# Patient Record
Sex: FEMALE | Race: WHITE | NOT HISPANIC OR LATINO
[De-identification: names, ages, dates, MRNs, and addresses within clinical notes are randomized per-mention and may not be internally consistent; named-entity substitution may affect disease eponyms.]

---

## 2017-07-03 ENCOUNTER — APPOINTMENT (OUTPATIENT)
Dept: ENDOCRINOLOGY | Facility: CLINIC | Age: 67
End: 2017-07-03

## 2017-07-03 ENCOUNTER — LABORATORY RESULT (OUTPATIENT)
Age: 67
End: 2017-07-03

## 2017-07-03 VITALS
BODY MASS INDEX: 23.39 KG/M2 | DIASTOLIC BLOOD PRESSURE: 70 MMHG | OXYGEN SATURATION: 97 % | WEIGHT: 137 LBS | HEART RATE: 68 BPM | SYSTOLIC BLOOD PRESSURE: 110 MMHG | HEIGHT: 64 IN

## 2017-07-03 VITALS — BODY MASS INDEX: 25.47 KG/M2 | HEIGHT: 61.5 IN

## 2017-07-03 DIAGNOSIS — Z82.0 FAMILY HISTORY OF EPILEPSY AND OTHER DISEASES OF THE NERVOUS SYSTEM: ICD-10-CM

## 2017-07-03 DIAGNOSIS — Z82.49 FAMILY HISTORY OF ISCHEMIC HEART DISEASE AND OTHER DISEASES OF THE CIRCULATORY SYSTEM: ICD-10-CM

## 2017-07-03 DIAGNOSIS — Z86.69 PERSONAL HISTORY OF OTHER DISEASES OF THE NERVOUS SYSTEM AND SENSE ORGANS: ICD-10-CM

## 2017-07-05 LAB
25(OH)D3 SERPL-MCNC: 35.3 NG/ML
ALBUMIN SERPL ELPH-MCNC: 4.2 G/DL
ALP BLD-CCNC: 79 U/L
ALT SERPL-CCNC: 18 U/L
ANION GAP SERPL CALC-SCNC: 16 MMOL/L
AST SERPL-CCNC: 23 U/L
BASOPHILS # BLD AUTO: 0.05 K/UL
BASOPHILS NFR BLD AUTO: 0.7 %
BILIRUB SERPL-MCNC: 0.4 MG/DL
BUN SERPL-MCNC: 23 MG/DL
CALCIUM SERPL-MCNC: 9.3 MG/DL
CHLORIDE SERPL-SCNC: 101 MMOL/L
CHOLEST SERPL-MCNC: 147 MG/DL
CHOLEST/HDLC SERPL: 1.9 RATIO
CO2 SERPL-SCNC: 24 MMOL/L
CREAT SERPL-MCNC: 0.72 MG/DL
CREAT SPEC-SCNC: 77 MG/DL
EOSINOPHIL # BLD AUTO: 0.34 K/UL
EOSINOPHIL NFR BLD AUTO: 4.7 %
GLUCOSE SERPL-MCNC: 199 MG/DL
HBA1C MFR BLD HPLC: 8 %
HCT VFR BLD CALC: 41.7 %
HDLC SERPL-MCNC: 78 MG/DL
HGB BLD-MCNC: 13.8 G/DL
IMM GRANULOCYTES NFR BLD AUTO: 0.6 %
LDLC SERPL CALC-MCNC: 50 MG/DL
LYMPHOCYTES # BLD AUTO: 2.29 K/UL
LYMPHOCYTES NFR BLD AUTO: 31.9 %
MAN DIFF?: NORMAL
MCHC RBC-ENTMCNC: 31.7 PG
MCHC RBC-ENTMCNC: 33.1 GM/DL
MCV RBC AUTO: 95.9 FL
MICROALBUMIN 24H UR DL<=1MG/L-MCNC: 0.7 MG/DL
MICROALBUMIN/CREAT 24H UR-RTO: 9 MG/G
MONOCYTES # BLD AUTO: 0.54 K/UL
MONOCYTES NFR BLD AUTO: 7.5 %
NEUTROPHILS # BLD AUTO: 3.91 K/UL
NEUTROPHILS NFR BLD AUTO: 54.6 %
PLATELET # BLD AUTO: 190 K/UL
POTASSIUM SERPL-SCNC: 4.3 MMOL/L
PROT SERPL-MCNC: 6.9 G/DL
RBC # BLD: 4.35 M/UL
RBC # FLD: 13.6 %
SODIUM SERPL-SCNC: 141 MMOL/L
T4 FREE SERPL-MCNC: 1.4 NG/DL
THYROGLOB AB SERPL-ACNC: <20 IU/ML
THYROPEROXIDASE AB SERPL IA-ACNC: 271 IU/ML
TRIGL SERPL-MCNC: 96 MG/DL
TSH SERPL-ACNC: 2.45 UIU/ML
WBC # FLD AUTO: 7.17 K/UL

## 2017-09-15 ENCOUNTER — MEDICATION RENEWAL (OUTPATIENT)
Age: 67
End: 2017-09-15

## 2017-09-18 ENCOUNTER — MEDICATION RENEWAL (OUTPATIENT)
Age: 67
End: 2017-09-18

## 2017-09-25 ENCOUNTER — MEDICATION RENEWAL (OUTPATIENT)
Age: 67
End: 2017-09-25

## 2017-09-25 RX ORDER — LANCETS
EACH MISCELLANEOUS
Qty: 700 | Refills: 3 | Status: ACTIVE | COMMUNITY
Start: 2017-09-25

## 2017-09-25 RX ORDER — LANCETS
EACH MISCELLANEOUS
Qty: 4 | Refills: 1 | Status: DISCONTINUED | COMMUNITY
Start: 2017-09-18 | End: 2017-09-25

## 2017-09-25 RX ORDER — BLOOD GLUCOSE CONTROL HIGH,LOW
EACH MISCELLANEOUS
Qty: 3 | Refills: 3 | Status: ACTIVE | COMMUNITY
Start: 2017-09-18

## 2017-09-25 RX ORDER — BLOOD SUGAR DIAGNOSTIC
STRIP MISCELLANEOUS 4 TIMES DAILY
Qty: 4 | Refills: 3 | Status: DISCONTINUED | COMMUNITY
Start: 2017-09-18 | End: 2017-09-25

## 2017-10-02 ENCOUNTER — OUTPATIENT (OUTPATIENT)
Dept: OUTPATIENT SERVICES | Facility: HOSPITAL | Age: 67
LOS: 1 days | End: 2017-10-02
Payer: MEDICARE

## 2017-10-02 ENCOUNTER — APPOINTMENT (OUTPATIENT)
Dept: MAMMOGRAPHY | Facility: IMAGING CENTER | Age: 67
End: 2017-10-02
Payer: MEDICARE

## 2017-10-02 DIAGNOSIS — Z00.8 ENCOUNTER FOR OTHER GENERAL EXAMINATION: ICD-10-CM

## 2017-10-02 PROCEDURE — 77063 BREAST TOMOSYNTHESIS BI: CPT

## 2017-10-02 PROCEDURE — 77067 SCR MAMMO BI INCL CAD: CPT

## 2017-10-02 PROCEDURE — G0202: CPT | Mod: 26

## 2017-10-02 PROCEDURE — 77063 BREAST TOMOSYNTHESIS BI: CPT | Mod: 26

## 2017-10-23 ENCOUNTER — APPOINTMENT (OUTPATIENT)
Dept: ENDOCRINOLOGY | Facility: CLINIC | Age: 67
End: 2017-10-23
Payer: MEDICARE

## 2017-10-23 VITALS
SYSTOLIC BLOOD PRESSURE: 110 MMHG | OXYGEN SATURATION: 96 % | HEART RATE: 60 BPM | BODY MASS INDEX: 25.53 KG/M2 | WEIGHT: 137 LBS | HEIGHT: 61.5 IN | DIASTOLIC BLOOD PRESSURE: 60 MMHG

## 2017-10-23 LAB
GLUCOSE BLDC GLUCOMTR-MCNC: 274
HBA1C MFR BLD HPLC: 7.9

## 2017-10-23 PROCEDURE — 82962 GLUCOSE BLOOD TEST: CPT

## 2017-10-23 PROCEDURE — 90686 IIV4 VACC NO PRSV 0.5 ML IM: CPT

## 2017-10-23 PROCEDURE — 99215 OFFICE O/P EST HI 40 MIN: CPT | Mod: 25

## 2017-10-23 PROCEDURE — 83036 HEMOGLOBIN GLYCOSYLATED A1C: CPT | Mod: QW

## 2017-10-23 PROCEDURE — G0008: CPT

## 2018-01-23 ENCOUNTER — APPOINTMENT (OUTPATIENT)
Dept: ENDOCRINOLOGY | Facility: CLINIC | Age: 68
End: 2018-01-23
Payer: MEDICARE

## 2018-01-23 VITALS
HEIGHT: 61.5 IN | HEART RATE: 79 BPM | OXYGEN SATURATION: 98 % | DIASTOLIC BLOOD PRESSURE: 70 MMHG | SYSTOLIC BLOOD PRESSURE: 118 MMHG | BODY MASS INDEX: 25.5 KG/M2 | WEIGHT: 136.8 LBS

## 2018-01-23 LAB
GLUCOSE BLDC GLUCOMTR-MCNC: 211
HBA1C MFR BLD HPLC: 8.2

## 2018-01-23 PROCEDURE — 83036 HEMOGLOBIN GLYCOSYLATED A1C: CPT | Mod: QW

## 2018-01-23 PROCEDURE — 99215 OFFICE O/P EST HI 40 MIN: CPT | Mod: 25

## 2018-01-23 PROCEDURE — 82962 GLUCOSE BLOOD TEST: CPT

## 2018-02-12 ENCOUNTER — CLINICAL ADVICE (OUTPATIENT)
Age: 68
End: 2018-02-12

## 2018-02-22 ENCOUNTER — CLINICAL ADVICE (OUTPATIENT)
Age: 68
End: 2018-02-22

## 2018-04-17 ENCOUNTER — APPOINTMENT (OUTPATIENT)
Dept: ENDOCRINOLOGY | Facility: CLINIC | Age: 68
End: 2018-04-17
Payer: MEDICARE

## 2018-04-17 VITALS
SYSTOLIC BLOOD PRESSURE: 115 MMHG | HEART RATE: 78 BPM | BODY MASS INDEX: 24.91 KG/M2 | DIASTOLIC BLOOD PRESSURE: 70 MMHG | WEIGHT: 134 LBS | OXYGEN SATURATION: 98 %

## 2018-04-17 LAB
GLUCOSE BLDC GLUCOMTR-MCNC: 98
HBA1C MFR BLD HPLC: 8.8

## 2018-04-17 PROCEDURE — 83036 HEMOGLOBIN GLYCOSYLATED A1C: CPT | Mod: QW

## 2018-04-17 PROCEDURE — 82962 GLUCOSE BLOOD TEST: CPT

## 2018-04-17 PROCEDURE — 99215 OFFICE O/P EST HI 40 MIN: CPT | Mod: 25

## 2018-04-18 LAB
25(OH)D3 SERPL-MCNC: 26.7 NG/ML
ALBUMIN SERPL ELPH-MCNC: 4.2 G/DL
ALP BLD-CCNC: 62 U/L
ALT SERPL-CCNC: 10 U/L
ANION GAP SERPL CALC-SCNC: 11 MMOL/L
AST SERPL-CCNC: 19 U/L
BASOPHILS # BLD AUTO: 0.04 K/UL
BASOPHILS NFR BLD AUTO: 0.6 %
BILIRUB SERPL-MCNC: 0.4 MG/DL
BUN SERPL-MCNC: 16 MG/DL
CALCIUM SERPL-MCNC: 10 MG/DL
CHLORIDE SERPL-SCNC: 105 MMOL/L
CHOLEST SERPL-MCNC: 155 MG/DL
CHOLEST/HDLC SERPL: 1.9 RATIO
CO2 SERPL-SCNC: 25 MMOL/L
CREAT SERPL-MCNC: 0.68 MG/DL
CREAT SPEC-SCNC: 56 MG/DL
EOSINOPHIL # BLD AUTO: 0.37 K/UL
EOSINOPHIL NFR BLD AUTO: 5.2 %
GLUCOSE SERPL-MCNC: 91 MG/DL
HCT VFR BLD CALC: 41.9 %
HDLC SERPL-MCNC: 83 MG/DL
HGB BLD-MCNC: 14.6 G/DL
IMM GRANULOCYTES NFR BLD AUTO: 0.1 %
LDLC SERPL CALC-MCNC: 60 MG/DL
LYMPHOCYTES # BLD AUTO: 3.21 K/UL
LYMPHOCYTES NFR BLD AUTO: 45 %
MAN DIFF?: NORMAL
MCHC RBC-ENTMCNC: 32.4 PG
MCHC RBC-ENTMCNC: 34.8 GM/DL
MCV RBC AUTO: 93.1 FL
MICROALBUMIN 24H UR DL<=1MG/L-MCNC: 0.5 MG/DL
MICROALBUMIN/CREAT 24H UR-RTO: 9 MG/G
MONOCYTES # BLD AUTO: 0.62 K/UL
MONOCYTES NFR BLD AUTO: 8.7 %
NEUTROPHILS # BLD AUTO: 2.89 K/UL
NEUTROPHILS NFR BLD AUTO: 40.4 %
PLATELET # BLD AUTO: 192 K/UL
POTASSIUM SERPL-SCNC: 4.2 MMOL/L
PROT SERPL-MCNC: 7.1 G/DL
RBC # BLD: 4.5 M/UL
RBC # FLD: 13.2 %
SODIUM SERPL-SCNC: 141 MMOL/L
T4 FREE SERPL-MCNC: 1.3 NG/DL
TRIGL SERPL-MCNC: 58 MG/DL
TSH SERPL-ACNC: 5.92 UIU/ML
WBC # FLD AUTO: 7.14 K/UL

## 2018-07-17 ENCOUNTER — APPOINTMENT (OUTPATIENT)
Dept: ENDOCRINOLOGY | Facility: CLINIC | Age: 68
End: 2018-07-17
Payer: MEDICARE

## 2018-07-17 VITALS — WEIGHT: 132 LBS | HEIGHT: 61.25 IN | BODY MASS INDEX: 24.6 KG/M2

## 2018-07-17 VITALS — OXYGEN SATURATION: 98 % | DIASTOLIC BLOOD PRESSURE: 72 MMHG | HEART RATE: 74 BPM | SYSTOLIC BLOOD PRESSURE: 122 MMHG

## 2018-07-17 DIAGNOSIS — Z87.39 PERSONAL HISTORY OF OTHER DISEASES OF THE MUSCULOSKELETAL SYSTEM AND CONNECTIVE TISSUE: ICD-10-CM

## 2018-07-17 LAB
GLUCOSE BLDC GLUCOMTR-MCNC: 126
HBA1C MFR BLD HPLC: 8

## 2018-07-17 PROCEDURE — ZZZZZ: CPT

## 2018-07-17 PROCEDURE — 99215 OFFICE O/P EST HI 40 MIN: CPT | Mod: 25

## 2018-07-17 PROCEDURE — 83036 HEMOGLOBIN GLYCOSYLATED A1C: CPT | Mod: QW

## 2018-07-17 PROCEDURE — 77080 DXA BONE DENSITY AXIAL: CPT

## 2018-07-17 PROCEDURE — 82962 GLUCOSE BLOOD TEST: CPT

## 2018-07-18 LAB
25(OH)D3 SERPL-MCNC: 28.1 NG/ML
ALBUMIN SERPL ELPH-MCNC: 4.2 G/DL
ALP BLD-CCNC: 61 U/L
ALT SERPL-CCNC: 15 U/L
ANION GAP SERPL CALC-SCNC: 13 MMOL/L
AST SERPL-CCNC: 22 U/L
BILIRUB SERPL-MCNC: 0.5 MG/DL
BUN SERPL-MCNC: 18 MG/DL
CALCIUM SERPL-MCNC: 10 MG/DL
CHLORIDE SERPL-SCNC: 103 MMOL/L
CO2 SERPL-SCNC: 26 MMOL/L
CREAT SERPL-MCNC: 0.67 MG/DL
GLUCOSE SERPL-MCNC: 124 MG/DL
POTASSIUM SERPL-SCNC: 4.1 MMOL/L
PROT SERPL-MCNC: 6.8 G/DL
SODIUM SERPL-SCNC: 142 MMOL/L
T4 FREE SERPL-MCNC: 1.5 NG/DL
TSH SERPL-ACNC: 1.52 UIU/ML

## 2018-09-04 ENCOUNTER — MEDICATION RENEWAL (OUTPATIENT)
Age: 68
End: 2018-09-04

## 2018-10-03 ENCOUNTER — APPOINTMENT (OUTPATIENT)
Dept: MAMMOGRAPHY | Facility: IMAGING CENTER | Age: 68
End: 2018-10-03
Payer: MEDICARE

## 2018-10-03 ENCOUNTER — APPOINTMENT (OUTPATIENT)
Dept: ULTRASOUND IMAGING | Facility: IMAGING CENTER | Age: 68
End: 2018-10-03
Payer: MEDICARE

## 2018-10-03 ENCOUNTER — OUTPATIENT (OUTPATIENT)
Dept: OUTPATIENT SERVICES | Facility: HOSPITAL | Age: 68
LOS: 1 days | End: 2018-10-03
Payer: MEDICARE

## 2018-10-03 DIAGNOSIS — Z12.31 ENCOUNTER FOR SCREENING MAMMOGRAM FOR MALIGNANT NEOPLASM OF BREAST: ICD-10-CM

## 2018-10-03 DIAGNOSIS — Z00.8 ENCOUNTER FOR OTHER GENERAL EXAMINATION: ICD-10-CM

## 2018-10-03 DIAGNOSIS — N60.19 DIFFUSE CYSTIC MASTOPATHY OF UNSPECIFIED BREAST: ICD-10-CM

## 2018-10-03 PROCEDURE — 76641 ULTRASOUND BREAST COMPLETE: CPT | Mod: 26,50

## 2018-10-03 PROCEDURE — 77067 SCR MAMMO BI INCL CAD: CPT

## 2018-10-03 PROCEDURE — 76641 ULTRASOUND BREAST COMPLETE: CPT

## 2018-10-03 PROCEDURE — 77063 BREAST TOMOSYNTHESIS BI: CPT | Mod: 26

## 2018-10-03 PROCEDURE — 77067 SCR MAMMO BI INCL CAD: CPT | Mod: 26

## 2018-10-03 PROCEDURE — 77063 BREAST TOMOSYNTHESIS BI: CPT

## 2018-10-09 ENCOUNTER — APPOINTMENT (OUTPATIENT)
Dept: ENDOCRINOLOGY | Facility: CLINIC | Age: 68
End: 2018-10-09
Payer: MEDICARE

## 2018-10-09 VITALS
HEART RATE: 68 BPM | SYSTOLIC BLOOD PRESSURE: 110 MMHG | BODY MASS INDEX: 25.31 KG/M2 | OXYGEN SATURATION: 98 % | WEIGHT: 135.8 LBS | HEIGHT: 61.25 IN | DIASTOLIC BLOOD PRESSURE: 60 MMHG

## 2018-10-09 LAB
GLUCOSE BLDC GLUCOMTR-MCNC: 160
HBA1C MFR BLD HPLC: 7.8

## 2018-10-09 PROCEDURE — 99215 OFFICE O/P EST HI 40 MIN: CPT | Mod: 25

## 2018-10-09 PROCEDURE — 95251 CONT GLUC MNTR ANALYSIS I&R: CPT

## 2018-10-09 PROCEDURE — G0008: CPT

## 2018-10-09 PROCEDURE — 83036 HEMOGLOBIN GLYCOSYLATED A1C: CPT | Mod: QW

## 2018-10-09 PROCEDURE — 90662 IIV NO PRSV INCREASED AG IM: CPT

## 2018-11-14 ENCOUNTER — RX RENEWAL (OUTPATIENT)
Age: 68
End: 2018-11-14

## 2018-12-10 ENCOUNTER — RX RENEWAL (OUTPATIENT)
Age: 68
End: 2018-12-10

## 2018-12-16 ENCOUNTER — RX RENEWAL (OUTPATIENT)
Age: 68
End: 2018-12-16

## 2018-12-18 ENCOUNTER — RX RENEWAL (OUTPATIENT)
Age: 68
End: 2018-12-18

## 2019-01-03 ENCOUNTER — RX RENEWAL (OUTPATIENT)
Age: 69
End: 2019-01-03

## 2019-02-13 ENCOUNTER — APPOINTMENT (OUTPATIENT)
Dept: ENDOCRINOLOGY | Facility: CLINIC | Age: 69
End: 2019-02-13
Payer: MEDICARE

## 2019-02-13 VITALS
SYSTOLIC BLOOD PRESSURE: 110 MMHG | WEIGHT: 134 LBS | OXYGEN SATURATION: 96 % | HEART RATE: 69 BPM | BODY MASS INDEX: 24.98 KG/M2 | HEIGHT: 61.25 IN | DIASTOLIC BLOOD PRESSURE: 70 MMHG

## 2019-02-13 LAB
GLUCOSE BLDC GLUCOMTR-MCNC: 177
HBA1C MFR BLD HPLC: 8.1

## 2019-02-13 PROCEDURE — 99214 OFFICE O/P EST MOD 30 MIN: CPT | Mod: 25

## 2019-02-13 PROCEDURE — 95251 CONT GLUC MNTR ANALYSIS I&R: CPT

## 2019-02-13 PROCEDURE — 83036 HEMOGLOBIN GLYCOSYLATED A1C: CPT | Mod: QW

## 2019-02-13 RX ORDER — FLASH GLUCOSE SENSOR
KIT MISCELLANEOUS
Qty: 1 | Refills: 0 | Status: DISCONTINUED | COMMUNITY
Start: 2018-09-04 | End: 2019-02-13

## 2019-02-13 RX ORDER — FLASH GLUCOSE SENSOR
KIT MISCELLANEOUS
Qty: 3 | Refills: 3 | Status: DISCONTINUED | COMMUNITY
Start: 2018-09-04 | End: 2019-02-13

## 2019-02-13 NOTE — REVIEW OF SYSTEMS
[Joint Pain] : joint pain [Hair Loss] : hair loss [Negative] : Gastrointestinal [All other systems negative] : All other systems negative [Polyuria] : no polyuria [Pain/Numbness of Digits] : no pain/numbness of digits [Polydipsia] : no polydipsia [Swelling] : no swelling

## 2019-02-13 NOTE — ASSESSMENT
[Carbohydrate Consistent Diet] : carbohydrate consistent diet [Hypoglycemia Management] : hypoglycemia management [Bisphosphonate Therapy] : Risks  and benefits of bisphosphonate therapy were  discussed with the patient including gastroesophageal irritation, osteonecrosis of the jaw, and atypical femur fractures, and acute phase reaction [Levothyroxine] : The patient was instructed to take Levothyroxine on an empty stomach, separate from vitamins, and wait at least 30 minutes before eating [FreeTextEntry1] : 69 y.o. female with h/o Type 1 DM, HTN, hyperlipidemia, hypothyroidism and osteoporosis. \par 1. Type 1 DM- Suboptimal control with Hba1c of 8.1%. Encouraged carbohydrate consistent diet. Glucometer and Freestyle Leonora download reviewed. Will continue Tresiba 22 units daily and will continue Humalog 5 units before breakfast and 6 to 7 units before lunch and dinner. Encouraged patient to avoid titrating up her Tresiba and rather treat bedtime snack with Humalog 1 to 2 units.  Suspect needs less Tresiba; however patient is reluctant to lower dose.  Encouraged patient to keep in touch with blood glucose readings. Will reorder Freestyle Leonora 14 day wear. Will check CMP and urine microalb/cr ratio. \par 2. HTN- BP is at goal and will continue medications\par 3. Hyperlipidemia- Will check lipids and will continue statin\par 4. Hypothyroidism/Hashimoto's disease- Patient appears euthyroid. Will check TFTs.  Will continue LT4 75 mcg daily. \par 5. Osteoporosis- DEXA scan performed in July 2018 shows spine -1.1 which is stable with arthritis, left femoral neck -2.1 which is stable and total hip -2.0 which is stable and 1/3 radius -3.6 which is stable. Given increased risk of fracture, will continue Alendronate 70 mg po weekly. Encouraged weight bearing activity. Discussed appropriate calcium and vitamin D intake. Will continue vitamin D3 2,000 IU daily.  Will repeat DEXA scan in 2020.\par \par Follow up in 3 months

## 2019-02-13 NOTE — HISTORY OF PRESENT ILLNESS
[Hypoglycemia] : hypoglycemic [FreeTextEntry1] : 69 y.o. female with h/o Type 1 1/2 DM diagnosed in 2009, osteoporosis, and hypothyroidism here for follow up visit. No acute events since last visit. On basal bolus regimen. Does get hypoglycemia in the late afternoon if delays eating. Taking Tresiba 22 units daily because when decreased Tresiba to 20 units developed fasting hyperglycemia. Takes Humalog 5 units before breakfast, 6 to 7 units before lunch and dinner. Does have evening snack and typically does not cover with insulin. No complications. UTD with optho and no retinopathy. Using Freestyle Leonora now but concerned about accuracy. Checks FS up to 6 times per day. No foot complaints. \par \par In regards to osteoporosis,  DEXA scan in July 2017 left femoral neck -2.2, total hip -2.0 and 1/3 radius -3.6 and spine -1.3 is falsely elevated. DEXA scan performed in July 2018 shows spine -1.1 which is stable with arthritis, left femoral neck -2.1 which is stable and total hip -2.0 which is stable and 1/3 radius -3.6 which is stable. Taking Alendronate 70 mg weekly since July 2017. No dental issues. Takes calcium 500 mg daily and vitamin D 2,000 Iu daily. No falls or fractures. \par \par In regards to hypothyroidism, takes LT4 75 mcg daily. Feeling good.

## 2019-02-13 NOTE — PHYSICAL EXAM
[Alert] : alert [No Acute Distress] : no acute distress [Normal Sclera/Conjunctiva] : normal sclera/conjunctiva [EOMI] : extra ocular movement intact [Supple] : the neck was supple [No LAD] : no lymphadenopathy [Thyroid Not Enlarged] : the thyroid was not enlarged [No Accessory Muscle Use] : no accessory muscle use [Clear to Auscultation] : lungs were clear to auscultation bilaterally [Normal S1, S2] : normal S1 and S2 [Regular Rhythm] : with a regular rhythm [Pedal Pulses Normal] : the pedal pulses are present [No Edema] : there was no peripheral edema [Normal Bowel Sounds] : normal bowel sounds [Not Tender] : non-tender [Soft] : abdomen soft [Not Distended] : not distended [No Clubbing, Cyanosis] : no clubbing  or cyanosis of the fingernails [No Rash] : no rash [Right Foot Was Examined] : right foot ~C was examined [Left Foot Was Examined] : left foot ~C was examined [Normal] : normal [2+] : 2+ in the dorsalis pedis [Diminished Throughout Both Feet] : diminished tactile sensation with monofilament testing throughout both feet [Normal Reflexes] : deep tendon reflexes were 2+ and symmetric [Normal Affect] : the affect was normal [Normal Mood] : the mood was normal [FreeTextEntry1] : bunion

## 2019-02-14 LAB
25(OH)D3 SERPL-MCNC: 39.1 NG/ML
ALBUMIN SERPL ELPH-MCNC: 3.8 G/DL
ALP BLD-CCNC: 69 U/L
ALT SERPL-CCNC: 13 U/L
ANION GAP SERPL CALC-SCNC: 10 MMOL/L
AST SERPL-CCNC: 25 U/L
BASOPHILS # BLD AUTO: 0.04 K/UL
BASOPHILS NFR BLD AUTO: 0.6 %
BILIRUB SERPL-MCNC: 0.3 MG/DL
BUN SERPL-MCNC: 16 MG/DL
CALCIUM SERPL-MCNC: 9.4 MG/DL
CHLORIDE SERPL-SCNC: 102 MMOL/L
CHOLEST SERPL-MCNC: 122 MG/DL
CHOLEST/HDLC SERPL: 2 RATIO
CO2 SERPL-SCNC: 26 MMOL/L
CREAT SERPL-MCNC: 0.57 MG/DL
CREAT SPEC-SCNC: 90 MG/DL
EOSINOPHIL # BLD AUTO: 0.39 K/UL
EOSINOPHIL NFR BLD AUTO: 6.1 %
GLUCOSE SERPL-MCNC: 196 MG/DL
HCT VFR BLD CALC: 42.3 %
HDLC SERPL-MCNC: 62 MG/DL
HGB BLD-MCNC: 13.4 G/DL
IMM GRANULOCYTES NFR BLD AUTO: 0.3 %
LDLC SERPL CALC-MCNC: 39 MG/DL
LYMPHOCYTES # BLD AUTO: 2.31 K/UL
LYMPHOCYTES NFR BLD AUTO: 36.2 %
MAN DIFF?: NORMAL
MCHC RBC-ENTMCNC: 31.2 PG
MCHC RBC-ENTMCNC: 31.7 GM/DL
MCV RBC AUTO: 98.4 FL
MICROALBUMIN 24H UR DL<=1MG/L-MCNC: <1.2 MG/DL
MICROALBUMIN/CREAT 24H UR-RTO: NORMAL
MONOCYTES # BLD AUTO: 0.48 K/UL
MONOCYTES NFR BLD AUTO: 7.5 %
NEUTROPHILS # BLD AUTO: 3.15 K/UL
NEUTROPHILS NFR BLD AUTO: 49.3 %
PLATELET # BLD AUTO: 232 K/UL
POTASSIUM SERPL-SCNC: 4.3 MMOL/L
PROT SERPL-MCNC: 7 G/DL
RBC # BLD: 4.3 M/UL
RBC # FLD: 13.1 %
SODIUM SERPL-SCNC: 138 MMOL/L
T4 FREE SERPL-MCNC: 1.5 NG/DL
TRIGL SERPL-MCNC: 104 MG/DL
TSH SERPL-ACNC: 1.74 UIU/ML
VIT B12 SERPL-MCNC: 771 PG/ML
WBC # FLD AUTO: 6.39 K/UL

## 2019-03-18 ENCOUNTER — RX CHANGE (OUTPATIENT)
Age: 69
End: 2019-03-18

## 2019-05-09 ENCOUNTER — RX RENEWAL (OUTPATIENT)
Age: 69
End: 2019-05-09

## 2019-06-24 ENCOUNTER — RX CHANGE (OUTPATIENT)
Age: 69
End: 2019-06-24

## 2019-06-24 RX ORDER — INSULIN DEGLUDEC INJECTION 100 U/ML
100 INJECTION, SOLUTION SUBCUTANEOUS
Qty: 2 | Refills: 3 | Status: COMPLETED | COMMUNITY
Start: 2017-07-03 | End: 2019-06-24

## 2019-06-26 ENCOUNTER — APPOINTMENT (OUTPATIENT)
Dept: ENDOCRINOLOGY | Facility: CLINIC | Age: 69
End: 2019-06-26
Payer: MEDICARE

## 2019-06-26 VITALS
OXYGEN SATURATION: 97 % | DIASTOLIC BLOOD PRESSURE: 60 MMHG | SYSTOLIC BLOOD PRESSURE: 110 MMHG | BODY MASS INDEX: 24.44 KG/M2 | WEIGHT: 131.13 LBS | HEART RATE: 68 BPM | HEIGHT: 61.25 IN

## 2019-06-26 LAB
GLUCOSE BLDC GLUCOMTR-MCNC: 97
HBA1C MFR BLD HPLC: 7.7

## 2019-06-26 PROCEDURE — 83036 HEMOGLOBIN GLYCOSYLATED A1C: CPT | Mod: QW

## 2019-06-26 PROCEDURE — 82962 GLUCOSE BLOOD TEST: CPT

## 2019-06-26 PROCEDURE — 99214 OFFICE O/P EST MOD 30 MIN: CPT | Mod: 25

## 2019-06-26 RX ORDER — INSULIN GLARGINE 100 [IU]/ML
100 INJECTION, SOLUTION SUBCUTANEOUS
Qty: 3 | Refills: 2 | Status: DISCONTINUED | COMMUNITY
Start: 2019-06-24 | End: 2019-06-26

## 2019-06-26 NOTE — HISTORY OF PRESENT ILLNESS
[FreeTextEntry1] : 69 y.o. female with h/o Type 1 1/2 DM diagnosed in 2009, osteoporosis, and hypothyroidism here for follow up visit. No acute events since last visit. On basal bolus regimen. Does get hypoglycemia in the late afternoon if delays eating. Taking Tresiba 22 units daily because when decreased Tresiba to 20 units developed fasting hyperglycemia. Takes Humalog 5 units before breakfast, 6 units before lunch and 6 to 7 units before dinner. Does have evening snack and typically does not cover with insulin. No complications. UTD with optho and no retinopathy. Not using Freestyle Leonora now because concerned about accuracy. Checks FS up to 6 times per day. No foot complaints. UTD with cardiology and normal stress test. \par \par In regards to osteoporosis,  DEXA scan in July 2017 left femoral neck -2.2, total hip -2.0 and 1/3 radius -3.6 and spine -1.3 is falsely elevated. DEXA scan performed in July 2018 shows spine -1.1 which is stable with arthritis, left femoral neck -2.1 which is stable and total hip -2.0 which is stable and 1/3 radius -3.6 which is stable. Taking Alendronate 70 mg weekly since July 2017. No dental issues. Takes calcium 500 mg daily and vitamin D 2,000 Iu daily. No falls or fractures. \par \par In regards to hypothyroidism, takes LT4 75 mcg daily. Feeling good.

## 2019-06-26 NOTE — REVIEW OF SYSTEMS
[Joint Pain] : joint pain [Hair Loss] : hair loss [Negative] : Gastrointestinal [All other systems negative] : All other systems negative [Pain/Numbness of Digits] : no pain/numbness of digits [Polyuria] : no polyuria [Polydipsia] : no polydipsia [Swelling] : no swelling

## 2019-06-26 NOTE — ASSESSMENT
[Carbohydrate Consistent Diet] : carbohydrate consistent diet [Hypoglycemia Management] : hypoglycemia management [Bisphosphonate Therapy] : Risks  and benefits of bisphosphonate therapy were  discussed with the patient including gastroesophageal irritation, osteonecrosis of the jaw, and atypical femur fractures, and acute phase reaction [Levothyroxine] : The patient was instructed to take Levothyroxine on an empty stomach, separate from vitamins, and wait at least 30 minutes before eating [FreeTextEntry1] : 69 y.o. female with h/o Type 1 DM, HTN, hyperlipidemia, hypothyroidism and osteoporosis. \par 1. Type 1 DM- Suboptimal control with Hba1c of 7.7%. Encouraged carbohydrate consistent diet. Glucometer download reviewed. Will decrease Tresiba to 20 units daily and will continue Humalog 5 units before breakfast and 6 units before lunch and will increase to 7 to 8 units before dinner. Encouraged patient to keep in touch with blood glucose readings. Patient will consider retrying Freestyle Leonora. Will check CMP and urine microalb/cr ratio. \par 2. HTN- BP is at goal and will continue medications\par 3. Hyperlipidemia- Will check lipids and will continue statin\par 4. Hypothyroidism/Hashimoto's disease- Patient appears euthyroid. Will check TFTs.  Will continue LT4 75 mcg daily. \par 5. Osteoporosis- DEXA scan performed in July 2018 shows spine -1.1 which is stable with arthritis, left femoral neck -2.1 which is stable and total hip -2.0 which is stable and 1/3 radius -3.6 which is stable. Given increased risk of fracture, will continue Alendronate 70 mg po weekly. Encouraged weight bearing activity. Discussed appropriate calcium and vitamin D intake. Will continue vitamin D3 2,000 IU daily.  Will repeat DEXA scan in 2020.\par \par Follow up in 3 months [Diabetes Foot Care] : diabetes foot care

## 2019-06-28 LAB
25(OH)D3 SERPL-MCNC: 36.9 NG/ML
ALBUMIN SERPL ELPH-MCNC: 4.2 G/DL
ALP BLD-CCNC: 57 U/L
ALT SERPL-CCNC: 16 U/L
ANION GAP SERPL CALC-SCNC: 12 MMOL/L
AST SERPL-CCNC: 22 U/L
BASOPHILS # BLD AUTO: 0.06 K/UL
BASOPHILS NFR BLD AUTO: 0.9 %
BILIRUB SERPL-MCNC: 0.4 MG/DL
BUN SERPL-MCNC: 17 MG/DL
CALCIUM SERPL-MCNC: 9.5 MG/DL
CHLORIDE SERPL-SCNC: 104 MMOL/L
CHOLEST SERPL-MCNC: 122 MG/DL
CHOLEST/HDLC SERPL: 1.7 RATIO
CO2 SERPL-SCNC: 25 MMOL/L
CREAT SERPL-MCNC: 0.67 MG/DL
CREAT SPEC-SCNC: 88 MG/DL
EOSINOPHIL # BLD AUTO: 0.29 K/UL
EOSINOPHIL NFR BLD AUTO: 4.2 %
GLUCOSE SERPL-MCNC: 104 MG/DL
HCT VFR BLD CALC: 40.3 %
HDLC SERPL-MCNC: 73 MG/DL
HGB BLD-MCNC: 13.5 G/DL
IMM GRANULOCYTES NFR BLD AUTO: 0.3 %
LDLC SERPL CALC-MCNC: 33 MG/DL
LYMPHOCYTES # BLD AUTO: 2.5 K/UL
LYMPHOCYTES NFR BLD AUTO: 36.4 %
MAN DIFF?: NORMAL
MCHC RBC-ENTMCNC: 32.1 PG
MCHC RBC-ENTMCNC: 33.5 GM/DL
MCV RBC AUTO: 96 FL
MICROALBUMIN 24H UR DL<=1MG/L-MCNC: <1.2 MG/DL
MICROALBUMIN/CREAT 24H UR-RTO: NORMAL MG/G
MONOCYTES # BLD AUTO: 0.57 K/UL
MONOCYTES NFR BLD AUTO: 8.3 %
NEUTROPHILS # BLD AUTO: 3.42 K/UL
NEUTROPHILS NFR BLD AUTO: 49.9 %
PLATELET # BLD AUTO: 184 K/UL
POTASSIUM SERPL-SCNC: 4 MMOL/L
PROT SERPL-MCNC: 6.5 G/DL
RBC # BLD: 4.2 M/UL
RBC # FLD: 12.6 %
SODIUM SERPL-SCNC: 141 MMOL/L
T4 FREE SERPL-MCNC: 1.4 NG/DL
TRIGL SERPL-MCNC: 82 MG/DL
TSH SERPL-ACNC: 1.54 UIU/ML
WBC # FLD AUTO: 6.86 K/UL

## 2019-07-12 RX ORDER — INSULIN GLARGINE 300 U/ML
300 INJECTION, SOLUTION SUBCUTANEOUS
Qty: 2 | Refills: 3 | Status: DISCONTINUED | COMMUNITY
Start: 2019-06-26 | End: 2019-07-12

## 2019-10-10 ENCOUNTER — APPOINTMENT (OUTPATIENT)
Dept: MAMMOGRAPHY | Facility: IMAGING CENTER | Age: 69
End: 2019-10-10
Payer: MEDICARE

## 2019-10-10 ENCOUNTER — OUTPATIENT (OUTPATIENT)
Dept: OUTPATIENT SERVICES | Facility: HOSPITAL | Age: 69
LOS: 1 days | End: 2019-10-10
Payer: MEDICARE

## 2019-10-10 ENCOUNTER — APPOINTMENT (OUTPATIENT)
Dept: ULTRASOUND IMAGING | Facility: IMAGING CENTER | Age: 69
End: 2019-10-10
Payer: MEDICARE

## 2019-10-10 DIAGNOSIS — Z00.8 ENCOUNTER FOR OTHER GENERAL EXAMINATION: ICD-10-CM

## 2019-10-10 PROCEDURE — 77067 SCR MAMMO BI INCL CAD: CPT

## 2019-10-10 PROCEDURE — 77067 SCR MAMMO BI INCL CAD: CPT | Mod: 26

## 2019-10-10 PROCEDURE — 76641 ULTRASOUND BREAST COMPLETE: CPT

## 2019-10-10 PROCEDURE — 77063 BREAST TOMOSYNTHESIS BI: CPT

## 2019-10-10 PROCEDURE — 77063 BREAST TOMOSYNTHESIS BI: CPT | Mod: 26

## 2019-10-10 PROCEDURE — 76641 ULTRASOUND BREAST COMPLETE: CPT | Mod: 26,50

## 2019-10-16 ENCOUNTER — OUTPATIENT (OUTPATIENT)
Dept: OUTPATIENT SERVICES | Facility: HOSPITAL | Age: 69
LOS: 1 days | End: 2019-10-16
Payer: MEDICARE

## 2019-10-16 ENCOUNTER — APPOINTMENT (OUTPATIENT)
Dept: ULTRASOUND IMAGING | Facility: IMAGING CENTER | Age: 69
End: 2019-10-16
Payer: MEDICARE

## 2019-10-16 DIAGNOSIS — Z00.8 ENCOUNTER FOR OTHER GENERAL EXAMINATION: ICD-10-CM

## 2019-10-16 PROCEDURE — 76642 ULTRASOUND BREAST LIMITED: CPT | Mod: 26,LT

## 2019-10-16 PROCEDURE — 76642 ULTRASOUND BREAST LIMITED: CPT

## 2019-10-18 ENCOUNTER — APPOINTMENT (OUTPATIENT)
Dept: ENDOCRINOLOGY | Facility: CLINIC | Age: 69
End: 2019-10-18
Payer: MEDICARE

## 2019-10-18 VITALS
SYSTOLIC BLOOD PRESSURE: 120 MMHG | OXYGEN SATURATION: 96 % | WEIGHT: 128 LBS | DIASTOLIC BLOOD PRESSURE: 60 MMHG | HEART RATE: 65 BPM | BODY MASS INDEX: 23.86 KG/M2 | HEIGHT: 61.42 IN

## 2019-10-18 LAB
GLUCOSE BLDC GLUCOMTR-MCNC: 98
HBA1C MFR BLD HPLC: 7.9

## 2019-10-18 PROCEDURE — 99214 OFFICE O/P EST MOD 30 MIN: CPT | Mod: 25

## 2019-10-18 PROCEDURE — G0008: CPT

## 2019-10-18 PROCEDURE — 83036 HEMOGLOBIN GLYCOSYLATED A1C: CPT | Mod: QW

## 2019-10-18 PROCEDURE — 90653 IIV ADJUVANT VACCINE IM: CPT

## 2019-10-18 PROCEDURE — 82962 GLUCOSE BLOOD TEST: CPT

## 2019-10-18 NOTE — PHYSICAL EXAM
[Alert] : alert [Normal Sclera/Conjunctiva] : normal sclera/conjunctiva [No Acute Distress] : no acute distress [EOMI] : extra ocular movement intact [No LAD] : no lymphadenopathy [Supple] : the neck was supple [Clear to Auscultation] : lungs were clear to auscultation bilaterally [Thyroid Not Enlarged] : the thyroid was not enlarged [No Accessory Muscle Use] : no accessory muscle use [Normal S1, S2] : normal S1 and S2 [Regular Rhythm] : with a regular rhythm [No Edema] : there was no peripheral edema [Pedal Pulses Normal] : the pedal pulses are present [Normal Bowel Sounds] : normal bowel sounds [Not Distended] : not distended [Not Tender] : non-tender [Soft] : abdomen soft [No Rash] : no rash [No Clubbing, Cyanosis] : no clubbing  or cyanosis of the fingernails [Right Foot Was Examined] : right foot ~C was examined [Left Foot Was Examined] : left foot ~C was examined [Normal] : normal [Normal Reflexes] : deep tendon reflexes were 2+ and symmetric [Diminished Throughout Both Feet] : diminished tactile sensation with monofilament testing throughout both feet [2+] : 2+ in the dorsalis pedis [Normal Affect] : the affect was normal [Normal Mood] : the mood was normal [FreeTextEntry1] : bunion [FreeTextEntry2] : hammer toes [FreeTextEntry6] : hammer toes

## 2019-10-18 NOTE — HISTORY OF PRESENT ILLNESS
[FreeTextEntry1] : 69 y.o. female with h/o Type 1 1/2 DM diagnosed in 2009, osteoporosis, and hypothyroidism here for follow up visit. No acute events since last visit. On basal bolus regimen. Does get hypoglycemia after breakfast. Taking Tresiba 22 units daily because when decreased Tresiba to 20 units developed fasting hyperglycemia. Takes Humalog 5 units before breakfast, 6 units before lunch and 6 to 7 units before dinner. Does have evening snack which includes 1/2 apple with no protein and typically does not cover with insulin. No complications. UTD with optho and no retinopathy. Not using Freestyle Leonora now because concerned about accuracy. Checks FS up to 6 times per day. No foot complaints. No proteinuria. UTD with cardiology and normal stress test. Reports weight loss. No abdominal pain and no change in bowels. \par \par In regards to osteoporosis,  DEXA scan in July 2017 left femoral neck -2.2, total hip -2.0 and 1/3 radius -3.6 and spine -1.3 is falsely elevated. DEXA scan performed in July 2018 shows spine -1.1 which is stable with arthritis, left femoral neck -2.1 which is stable and total hip -2.0 which is stable and 1/3 radius -3.6 which is stable. Taking Alendronate 70 mg weekly since July 2017. Working with dentist. Takes calcium 500 mg daily and vitamin D 2,000 Iu daily. No falls or fractures. \par \par In regards to hypothyroidism, takes LT4 75 mcg daily. Feeling good. Reports weight loss.  [___] : [unfilled] [Hypoglycemia] : hypoglycemic

## 2019-10-18 NOTE — ASSESSMENT
[FreeTextEntry1] : 69 y.o. female with h/o Type 1 DM, HTN, hyperlipidemia, hypothyroidism and osteoporosis. \par 1. Type 1 DM- Suboptimal control with Hba1c of 7.9% today. Encouraged carbohydrate consistent diet. Glucometer download reviewed. Will continue Tresiba 22 units daily and will decrease Humalog to 4 units before breakfast and will continue 6 units before lunch and 6 to 7 units before dinner. Also encouraged modifying bedtime snack. Encouraged patient to keep in touch with blood glucose readings. Patient will consider retrying Freestyle Leonora. Will check CMP and urine microalb/cr ratio. \par 2. HTN- BP is at goal and will continue medications\par 3. Hyperlipidemia- Will check lipids and will continue statin\par 4. Hypothyroidism/Hashimoto's disease- Patient appears euthyroid. Will check TFTs.  Will continue LT4 75 mcg daily. \par 5. Osteoporosis- DEXA scan performed in July 2018 shows spine -1.1 which is stable with arthritis, left femoral neck -2.1 which is stable and total hip -2.0 which is stable and 1/3 radius -3.6 which is stable. Given increased risk of fracture, will continue Alendronate 70 mg po weekly. Encouraged weight bearing activity. Discussed appropriate calcium and vitamin D intake. Will continue vitamin D3 2,000 IU daily.  Will repeat DEXA scan in 2020.\par \par Follow up in 3 months\par Flu vaccine given today [Carbohydrate Consistent Diet] : carbohydrate consistent diet [Hypoglycemia Management] : hypoglycemia management [Diabetes Foot Care] : diabetes foot care [Bisphosphonate Therapy] : Risks  and benefits of bisphosphonate therapy were  discussed with the patient including gastroesophageal irritation, osteonecrosis of the jaw, and atypical femur fractures, and acute phase reaction [Levothyroxine] : The patient was instructed to take Levothyroxine on an empty stomach, separate from vitamins, and wait at least 30 minutes before eating

## 2019-10-18 NOTE — REVIEW OF SYSTEMS
[Recent Weight Loss (___ Lbs)] : recent [unfilled] ~Ulb weight loss [Polyuria] : no polyuria [Joint Pain] : joint pain [Hair Loss] : hair loss [Pain/Numbness of Digits] : no pain/numbness of digits [Polydipsia] : no polydipsia [Swelling] : no swelling [All other systems negative] : All other systems negative [Negative] : Gastrointestinal

## 2019-10-19 LAB
ALBUMIN SERPL ELPH-MCNC: 4.3 G/DL
ALP BLD-CCNC: 60 U/L
ALT SERPL-CCNC: 13 U/L
ANION GAP SERPL CALC-SCNC: 13 MMOL/L
AST SERPL-CCNC: 23 U/L
BASOPHILS # BLD AUTO: 0.05 K/UL
BASOPHILS NFR BLD AUTO: 0.8 %
BILIRUB SERPL-MCNC: 0.4 MG/DL
BUN SERPL-MCNC: 15 MG/DL
CALCIUM SERPL-MCNC: 9.7 MG/DL
CHLORIDE SERPL-SCNC: 103 MMOL/L
CHOLEST SERPL-MCNC: 129 MG/DL
CHOLEST/HDLC SERPL: 1.7 RATIO
CO2 SERPL-SCNC: 25 MMOL/L
CREAT SERPL-MCNC: 0.55 MG/DL
CREAT SPEC-SCNC: 45 MG/DL
EOSINOPHIL # BLD AUTO: 0.2 K/UL
EOSINOPHIL NFR BLD AUTO: 3 %
GLUCOSE SERPL-MCNC: 67 MG/DL
HCT VFR BLD CALC: 43.6 %
HDLC SERPL-MCNC: 74 MG/DL
HGB BLD-MCNC: 14.1 G/DL
IMM GRANULOCYTES NFR BLD AUTO: 0.6 %
LDLC SERPL CALC-MCNC: 47 MG/DL
LYMPHOCYTES # BLD AUTO: 2.64 K/UL
LYMPHOCYTES NFR BLD AUTO: 40 %
MAN DIFF?: NORMAL
MCHC RBC-ENTMCNC: 32 PG
MCHC RBC-ENTMCNC: 32.3 GM/DL
MCV RBC AUTO: 99.1 FL
MICROALBUMIN 24H UR DL<=1MG/L-MCNC: <1.2 MG/DL
MICROALBUMIN/CREAT 24H UR-RTO: NORMAL MG/G
MONOCYTES # BLD AUTO: 0.59 K/UL
MONOCYTES NFR BLD AUTO: 8.9 %
NEUTROPHILS # BLD AUTO: 3.08 K/UL
NEUTROPHILS NFR BLD AUTO: 46.7 %
PLATELET # BLD AUTO: 215 K/UL
POTASSIUM SERPL-SCNC: 4.3 MMOL/L
PROT SERPL-MCNC: 7 G/DL
RBC # BLD: 4.4 M/UL
RBC # FLD: 13.1 %
SODIUM SERPL-SCNC: 141 MMOL/L
T4 FREE SERPL-MCNC: 1.3 NG/DL
TRIGL SERPL-MCNC: 40 MG/DL
TSH SERPL-ACNC: 3.29 UIU/ML
WBC # FLD AUTO: 6.6 K/UL

## 2019-10-21 ENCOUNTER — APPOINTMENT (OUTPATIENT)
Dept: MAMMOGRAPHY | Facility: IMAGING CENTER | Age: 69
End: 2019-10-21
Payer: MEDICARE

## 2019-10-21 ENCOUNTER — OUTPATIENT (OUTPATIENT)
Dept: OUTPATIENT SERVICES | Facility: HOSPITAL | Age: 69
LOS: 1 days | End: 2019-10-21
Payer: MEDICARE

## 2019-10-21 ENCOUNTER — RESULT REVIEW (OUTPATIENT)
Age: 69
End: 2019-10-21

## 2019-10-21 DIAGNOSIS — R92.8 OTHER ABNORMAL AND INCONCLUSIVE FINDINGS ON DIAGNOSTIC IMAGING OF BREAST: ICD-10-CM

## 2019-10-21 PROCEDURE — 88305 TISSUE EXAM BY PATHOLOGIST: CPT | Mod: 26

## 2019-10-21 PROCEDURE — 77065 DX MAMMO INCL CAD UNI: CPT

## 2019-10-21 PROCEDURE — 19081 BX BREAST 1ST LESION STRTCTC: CPT

## 2019-10-21 PROCEDURE — 77065 DX MAMMO INCL CAD UNI: CPT | Mod: 26,LT

## 2019-10-21 PROCEDURE — 88305 TISSUE EXAM BY PATHOLOGIST: CPT

## 2019-10-22 LAB — SURGICAL PATHOLOGY STUDY: SIGNIFICANT CHANGE UP

## 2020-01-24 ENCOUNTER — APPOINTMENT (OUTPATIENT)
Dept: ENDOCRINOLOGY | Facility: CLINIC | Age: 70
End: 2020-01-24
Payer: MEDICARE

## 2020-01-24 VITALS
OXYGEN SATURATION: 97 % | HEART RATE: 60 BPM | WEIGHT: 125 LBS | BODY MASS INDEX: 23.3 KG/M2 | SYSTOLIC BLOOD PRESSURE: 116 MMHG | HEIGHT: 61.42 IN | DIASTOLIC BLOOD PRESSURE: 66 MMHG

## 2020-01-24 LAB
GLUCOSE BLDC GLUCOMTR-MCNC: 188
HBA1C MFR BLD HPLC: 8.3

## 2020-01-24 PROCEDURE — 95251 CONT GLUC MNTR ANALYSIS I&R: CPT

## 2020-01-24 PROCEDURE — 99215 OFFICE O/P EST HI 40 MIN: CPT | Mod: 25

## 2020-01-24 PROCEDURE — 83036 HEMOGLOBIN GLYCOSYLATED A1C: CPT | Mod: QW

## 2020-01-24 RX ORDER — BLOOD-GLUCOSE,RECEIVER,CONT
EACH MISCELLANEOUS
Qty: 1 | Refills: 1 | Status: ACTIVE | COMMUNITY
Start: 2020-01-24 | End: 1900-01-01

## 2020-01-24 RX ORDER — BLOOD-GLUCOSE SENSOR
EACH MISCELLANEOUS
Qty: 3 | Refills: 3 | Status: ACTIVE | COMMUNITY
Start: 2020-01-24 | End: 1900-01-01

## 2020-01-24 RX ORDER — BLOOD-GLUCOSE TRANSMITTER
EACH MISCELLANEOUS
Qty: 1 | Refills: 1 | Status: ACTIVE | COMMUNITY
Start: 2020-01-24 | End: 1900-01-01

## 2020-01-24 RX ORDER — GLUCAGON 1 MG
1 KIT INJECTION
Qty: 1 | Refills: 3 | Status: ACTIVE | COMMUNITY
Start: 2017-07-03 | End: 1900-01-01

## 2020-01-25 NOTE — REVIEW OF SYSTEMS
[Recent Weight Loss (___ Lbs)] : recent [unfilled] ~Ulb weight loss [Joint Pain] : joint pain [Hair Loss] : hair loss [All other systems negative] : All other systems negative [Negative] : Gastrointestinal [Polyuria] : no polyuria [Polydipsia] : no polydipsia [Pain/Numbness of Digits] : no pain/numbness of digits [Stress] : stress [Swelling] : no swelling

## 2020-01-25 NOTE — PHYSICAL EXAM
[Alert] : alert [No Acute Distress] : no acute distress [Normal Sclera/Conjunctiva] : normal sclera/conjunctiva [EOMI] : extra ocular movement intact [Supple] : the neck was supple [Thyroid Not Enlarged] : the thyroid was not enlarged [No LAD] : no lymphadenopathy [No Accessory Muscle Use] : no accessory muscle use [Clear to Auscultation] : lungs were clear to auscultation bilaterally [Normal S1, S2] : normal S1 and S2 [Regular Rhythm] : with a regular rhythm [Pedal Pulses Normal] : the pedal pulses are present [No Edema] : there was no peripheral edema [Normal Bowel Sounds] : normal bowel sounds [Not Tender] : non-tender [Soft] : abdomen soft [Not Distended] : not distended [No Clubbing, Cyanosis] : no clubbing  or cyanosis of the fingernails [No Rash] : no rash [Right Foot Was Examined] : right foot ~C was examined [Left Foot Was Examined] : left foot ~C was examined [Normal] : normal [Diminished Throughout Both Feet] : diminished tactile sensation with monofilament testing throughout both feet [Normal Affect] : the affect was normal [Normal Reflexes] : deep tendon reflexes were 2+ and symmetric [Normal Mood] : the mood was normal [FreeTextEntry1] : bunion [2+] : 2+ in the dorsalis pedis [FreeTextEntry6] : hammer toes [FreeTextEntry2] : hammer toes

## 2020-01-25 NOTE — ASSESSMENT
[Carbohydrate Consistent Diet] : carbohydrate consistent diet [Hypoglycemia Management] : hypoglycemia management [Diabetes Foot Care] : diabetes foot care [Bisphosphonate Therapy] : Risks  and benefits of bisphosphonate therapy were  discussed with the patient including gastroesophageal irritation, osteonecrosis of the jaw, and atypical femur fractures, and acute phase reaction [Levothyroxine] : The patient was instructed to take Levothyroxine on an empty stomach, separate from vitamins, and wait at least 30 minutes before eating [FreeTextEntry1] : 69 y.o. female with h/o Type 1 DM, HTN, hyperlipidemia, hypothyroidism and osteoporosis. \par 1. Type 1 DM- Suboptimal control with Hba1c of 8.3% today. Encouraged carbohydrate consistent diet. Glucometer and Leonora download reviewed. Will decrease Tresiba to 20 units daily and will continue Humalog 5 units before breakfast and will continue 6 to 7 units before lunch and increase by 1 unit before dinner. Also encouraged modifying bedtime snack. Encouraged patient to keep in touch with blood glucose readings. Will order Dexcom G6 for better accuracy and safety given patient does have hypoglycemia. Will check CMP and urine microalb/cr ratio. \par 2. HTN- BP is at goal and will continue medications\par 3. Hyperlipidemia- Will check lipids and will continue statin\par 4. Hypothyroidism/Hashimoto's disease- Patient appears euthyroid. Will check TFTs.  Will continue LT4 75 mcg daily. \par 5. Osteoporosis- DEXA scan performed in July 2018 shows spine -1.1 which is stable with arthritis, left femoral neck -2.1 which is stable and total hip -2.0 which is stable and 1/3 radius -3.6 which is stable. Given increased risk of fracture, will continue Alendronate 70 mg po weekly. Encouraged weight bearing activity. Discussed appropriate calcium and vitamin D intake. Will continue vitamin D3 2,000 IU daily.  Will repeat DEXA scan in 7/2020.\par \par Follow up in 3 months\par Already received flu vaccine\par Follow up with GI given weight loss

## 2020-01-27 LAB
25(OH)D3 SERPL-MCNC: 34.3 NG/ML
ALBUMIN SERPL ELPH-MCNC: 4.1 G/DL
ALP BLD-CCNC: 68 U/L
ALT SERPL-CCNC: 19 U/L
ANION GAP SERPL CALC-SCNC: 12 MMOL/L
AST SERPL-CCNC: 24 U/L
BASOPHILS # BLD AUTO: 0.05 K/UL
BASOPHILS NFR BLD AUTO: 0.8 %
BILIRUB SERPL-MCNC: 0.4 MG/DL
BUN SERPL-MCNC: 16 MG/DL
CALCIUM SERPL-MCNC: 9.2 MG/DL
CHLORIDE SERPL-SCNC: 104 MMOL/L
CHOLEST SERPL-MCNC: 124 MG/DL
CHOLEST/HDLC SERPL: 2.1 RATIO
CO2 SERPL-SCNC: 26 MMOL/L
CREAT SERPL-MCNC: 0.55 MG/DL
CREAT SPEC-SCNC: 65 MG/DL
EOSINOPHIL # BLD AUTO: 0.31 K/UL
EOSINOPHIL NFR BLD AUTO: 5 %
GLUCOSE SERPL-MCNC: 181 MG/DL
HCT VFR BLD CALC: 42.8 %
HDLC SERPL-MCNC: 59 MG/DL
HGB BLD-MCNC: 14 G/DL
IMM GRANULOCYTES NFR BLD AUTO: 0.5 %
IRON SATN MFR SERPL: 28 %
IRON SERPL-MCNC: 71 UG/DL
LDLC SERPL CALC-MCNC: 56 MG/DL
LYMPHOCYTES # BLD AUTO: 2.36 K/UL
LYMPHOCYTES NFR BLD AUTO: 38.3 %
MAN DIFF?: NORMAL
MCHC RBC-ENTMCNC: 32 PG
MCHC RBC-ENTMCNC: 32.7 GM/DL
MCV RBC AUTO: 97.9 FL
MICROALBUMIN 24H UR DL<=1MG/L-MCNC: <1.2 MG/DL
MICROALBUMIN/CREAT 24H UR-RTO: NORMAL MG/G
MONOCYTES # BLD AUTO: 0.45 K/UL
MONOCYTES NFR BLD AUTO: 7.3 %
NEUTROPHILS # BLD AUTO: 2.96 K/UL
NEUTROPHILS NFR BLD AUTO: 48.1 %
PLATELET # BLD AUTO: 213 K/UL
POTASSIUM SERPL-SCNC: 4.3 MMOL/L
PROT SERPL-MCNC: 6.8 G/DL
RBC # BLD: 4.37 M/UL
RBC # FLD: 12.8 %
SODIUM SERPL-SCNC: 142 MMOL/L
T4 FREE SERPL-MCNC: 1.3 NG/DL
TIBC SERPL-MCNC: 249 UG/DL
TRIGL SERPL-MCNC: 44 MG/DL
TSH SERPL-ACNC: 3.76 UIU/ML
UIBC SERPL-MCNC: 178 UG/DL
WBC # FLD AUTO: 6.16 K/UL

## 2020-01-28 LAB
TTG IGA SER IA-ACNC: <1.2 U/ML
TTG IGA SER-ACNC: NEGATIVE
TTG IGG SER IA-ACNC: 1.3 U/ML
TTG IGG SER IA-ACNC: NEGATIVE

## 2020-02-26 ENCOUNTER — APPOINTMENT (OUTPATIENT)
Dept: ENDOCRINOLOGY | Facility: CLINIC | Age: 70
End: 2020-02-26
Payer: MEDICARE

## 2020-02-26 PROCEDURE — ZZZZZ: CPT

## 2020-02-26 PROCEDURE — 95251 CONT GLUC MNTR ANALYSIS I&R: CPT

## 2020-02-26 PROCEDURE — 95249 CONT GLUC MNTR PT PROV EQP: CPT

## 2020-03-31 ENCOUNTER — APPOINTMENT (OUTPATIENT)
Dept: ENDOCRINOLOGY | Facility: CLINIC | Age: 70
End: 2020-03-31

## 2020-04-09 ENCOUNTER — RX RENEWAL (OUTPATIENT)
Age: 70
End: 2020-04-09

## 2020-05-15 ENCOUNTER — APPOINTMENT (OUTPATIENT)
Dept: ENDOCRINOLOGY | Facility: CLINIC | Age: 70
End: 2020-05-15

## 2020-07-15 ENCOUNTER — APPOINTMENT (OUTPATIENT)
Dept: ENDOCRINOLOGY | Facility: CLINIC | Age: 70
End: 2020-07-15
Payer: MEDICARE

## 2020-07-15 VITALS — HEIGHT: 61.3 IN | WEIGHT: 127 LBS | TEMPERATURE: 98.3 F | BODY MASS INDEX: 23.67 KG/M2

## 2020-07-15 VITALS — SYSTOLIC BLOOD PRESSURE: 110 MMHG | DIASTOLIC BLOOD PRESSURE: 60 MMHG | OXYGEN SATURATION: 98 % | HEART RATE: 67 BPM

## 2020-07-15 LAB
GLUCOSE BLDC GLUCOMTR-MCNC: 206
HBA1C MFR BLD HPLC: 7.3

## 2020-07-15 PROCEDURE — 95251 CONT GLUC MNTR ANALYSIS I&R: CPT

## 2020-07-15 PROCEDURE — ZZZZZ: CPT

## 2020-07-15 PROCEDURE — 77080 DXA BONE DENSITY AXIAL: CPT

## 2020-07-15 PROCEDURE — 99214 OFFICE O/P EST MOD 30 MIN: CPT | Mod: 25

## 2020-07-15 PROCEDURE — 83036 HEMOGLOBIN GLYCOSYLATED A1C: CPT | Mod: QW

## 2020-07-15 NOTE — HISTORY OF PRESENT ILLNESS
[Hypoglycemia] : hypoglycemic [FreeTextEntry1] : 70 y.o. female with h/o Type 1 1/2 DM diagnosed in 2009, osteoporosis, and hypothyroidism here for follow up visit. No acute events since last visit.  Using Dexcom now and happy with it. On basal bolus regimen. Less hypoglycemia. Taking Tresiba 22 units daily because when decreased Tresiba to 20 units developed fasting hyperglycemia. Takes Humalog 5 to 6 units before breakfast, 7 units before lunch and 7 units before dinner. Does have evening snack which includes 1/2 apple with no protein and typically does not cover with insulin. No complications. UTD with opthalmology and no retinopathy.  No foot complaints. No proteinuria. UTD with cardiology and had normal stress test. Reports weight is stable now. Had full GI work up for weight loss which was normal. No abdominal pain and no change in bowels. \par \par In regards to osteoporosis,  DEXA scan in July 2017 left femoral neck -2.2, total hip -2.0 and 1/3 radius -3.6 and spine -1.3 is falsely elevated. DEXA scan performed in July 2018 shows spine -1.1 which is stable with arthritis, left femoral neck -2.1 which is stable and total hip -2.0 which is stable and 1/3 radius -3.6 which is stable. Taking Alendronate 70 mg weekly since July 2017. Working with dentist and had implants in October 2019. Takes calcium 500 mg daily and vitamin D 2,000 Iu daily. No falls or fractures. \par \par In regards to hypothyroidism, takes LT4 75 mcg daily. Feeling good. Reports weight is stable.

## 2020-07-15 NOTE — REASON FOR VISIT
[Follow - Up] : a follow-up visit [Other___] : [unfilled] [Hypothyroidism] : hypothyroidism [Osteoporosis] : osteoporosis

## 2020-07-15 NOTE — REVIEW OF SYSTEMS
[Hair Loss] : hair loss [Pain/Numbness of Digits] : pain/numbness of digits [Negative] : Endocrine [Fatigue] : no fatigue [Recent Weight Gain (___ Lbs)] : no recent weight gain [Recent Weight Loss (___ Lbs)] : no recent weight loss [Polyuria] : no polyuria [Swelling] : no swelling

## 2020-07-15 NOTE — ASSESSMENT
[Carbohydrate Consistent Diet] : carbohydrate consistent diet [Hypoglycemia Management] : hypoglycemia management [Diabetes Foot Care] : diabetes foot care [Bisphosphonate Therapy] : Risks  and benefits of bisphosphonate therapy were  discussed with the patient including gastroesophageal irritation, osteonecrosis of the jaw, and atypical femur fractures, and acute phase reaction [Levothyroxine] : The patient was instructed to take Levothyroxine on an empty stomach, separate from vitamins, and wait at least 30 minutes before eating [FreeTextEntry1] : 70 y.o. female with h/o Type 1 DM, HTN, hyperlipidemia, hypothyroidism and osteoporosis. \par 1. Type 1 DM- Good control with Hba1c of 7.3% today. Encouraged carbohydrate consistent diet. Dexcom CGMS download reviewed. Will decrease Tresiba to 21 units daily and will continue Humalog 5 units before breakfast and will continue 7 units before lunch and before dinner. Also encouraged modifying bedtime snack. Encouraged patient to keep in touch with blood glucose readings. Will continue with Dexcom G6 for better accuracy and safety given patient now has less hypoglycemia. Will check CMP and urine microalb/cr ratio. \par 2. HTN- BP is at goal and will continue medications\par 3. Hyperlipidemia- Will check lipids and will continue statin\par 4. Hypothyroidism/Hashimoto's disease- Patient appears euthyroid. Will check TFTs.  Will continue LT4 75 mcg daily. \par 5. Osteoporosis- DEXA scan performed today shows spine -0.9 which is stable with arthritis, left femoral neck -1.9 which is stable and total hip -2.0 which is stable and 1/3 radius -3.5 which is stable. Given increased risk of fracture, will continue Alendronate 70 mg po weekly. Encouraged weight bearing activity. Discussed appropriate calcium and vitamin D intake. Will continue vitamin D3 2,000 IU daily.  Will repeat DEXA scan in 7/2022.\par \par Follow up in 3 months

## 2020-07-15 NOTE — PHYSICAL EXAM
[Alert] : alert [No Acute Distress] : no acute distress [No LAD] : no lymphadenopathy [Normal Sclera/Conjunctiva] : normal sclera/conjunctiva [EOMI] : extra ocular movement intact [No Thyroid Nodules] : no palpable thyroid nodules [Thyroid Not Enlarged] : the thyroid was not enlarged [No Respiratory Distress] : no respiratory distress [Clear to Auscultation] : lungs were clear to auscultation bilaterally [Normal S1, S2] : normal S1 and S2 [Regular Rhythm] : with a regular rhythm [No Edema] : no peripheral edema [Pedal Pulses Normal] : the pedal pulses are present [Normal Bowel Sounds] : normal bowel sounds [Not Tender] : non-tender [Not Distended] : not distended [Soft] : abdomen soft [Kyphosis] : kyphosis present [No Spinal Tenderness] : no spinal tenderness [Normal Anterior Cervical Nodes] : no anterior cervical lymphadenopathy [Right Foot Was Examined] : right foot ~C was examined [No Rash] : no rash [No Clubbing, Cyanosis] : no clubbing  or cyanosis of the fingernails [Left Foot Was Examined] : left foot ~C was examined [2+] : 2+ in the dorsalis pedis [Diminished Throughout Both Feet] : diminished tactile sensation with monofilament testing throughout both feet [Normal] : normal [Normal Reflexes] : deep tendon reflexes were 2+ and symmetric [Normal Affect] : the affect was normal [Normal Mood] : the mood was normal [FreeTextEntry1] : bunion [FreeTextEntry5] : bunion [de-identified] : 2/6 DANIEL

## 2020-07-16 LAB
25(OH)D3 SERPL-MCNC: 36.7 NG/ML
ALBUMIN SERPL ELPH-MCNC: 4.2 G/DL
ALP BLD-CCNC: 65 U/L
ALT SERPL-CCNC: 15 U/L
ANION GAP SERPL CALC-SCNC: 12 MMOL/L
AST SERPL-CCNC: 22 U/L
BASOPHILS # BLD AUTO: 0.06 K/UL
BASOPHILS NFR BLD AUTO: 1.1 %
BILIRUB SERPL-MCNC: 0.6 MG/DL
BUN SERPL-MCNC: 16 MG/DL
CALCIUM SERPL-MCNC: 9.4 MG/DL
CHLORIDE SERPL-SCNC: 104 MMOL/L
CHOLEST SERPL-MCNC: 143 MG/DL
CHOLEST/HDLC SERPL: 1.8 RATIO
CO2 SERPL-SCNC: 25 MMOL/L
CREAT SERPL-MCNC: 0.58 MG/DL
CREAT SPEC-SCNC: 50 MG/DL
EOSINOPHIL # BLD AUTO: 0.33 K/UL
EOSINOPHIL NFR BLD AUTO: 6.1 %
GLUCOSE SERPL-MCNC: 184 MG/DL
HCT VFR BLD CALC: 41.9 %
HDLC SERPL-MCNC: 80 MG/DL
HGB BLD-MCNC: 13.7 G/DL
IMM GRANULOCYTES NFR BLD AUTO: 0.4 %
LDLC SERPL CALC-MCNC: 48 MG/DL
LYMPHOCYTES # BLD AUTO: 2.1 K/UL
LYMPHOCYTES NFR BLD AUTO: 38.8 %
MAN DIFF?: NORMAL
MCHC RBC-ENTMCNC: 32 PG
MCHC RBC-ENTMCNC: 32.7 GM/DL
MCV RBC AUTO: 97.9 FL
MICROALBUMIN 24H UR DL<=1MG/L-MCNC: <1.2 MG/DL
MICROALBUMIN/CREAT 24H UR-RTO: NORMAL MG/G
MONOCYTES # BLD AUTO: 0.56 K/UL
MONOCYTES NFR BLD AUTO: 10.4 %
NEUTROPHILS # BLD AUTO: 2.34 K/UL
NEUTROPHILS NFR BLD AUTO: 43.2 %
PLATELET # BLD AUTO: 167 K/UL
POTASSIUM SERPL-SCNC: 4.1 MMOL/L
PROT SERPL-MCNC: 6.8 G/DL
RBC # BLD: 4.28 M/UL
RBC # FLD: 12.7 %
SODIUM SERPL-SCNC: 142 MMOL/L
T4 FREE SERPL-MCNC: 1.4 NG/DL
TRIGL SERPL-MCNC: 81 MG/DL
TSH SERPL-ACNC: 1.27 UIU/ML
WBC # FLD AUTO: 5.41 K/UL

## 2020-09-10 ENCOUNTER — APPOINTMENT (OUTPATIENT)
Dept: OTOLARYNGOLOGY | Facility: CLINIC | Age: 70
End: 2020-09-10
Payer: MEDICARE

## 2020-09-10 VITALS
SYSTOLIC BLOOD PRESSURE: 118 MMHG | BODY MASS INDEX: 23.37 KG/M2 | HEIGHT: 62 IN | WEIGHT: 127 LBS | DIASTOLIC BLOOD PRESSURE: 66 MMHG | TEMPERATURE: 98.1 F | HEART RATE: 60 BPM

## 2020-09-10 DIAGNOSIS — L29.9 PRURITUS, UNSPECIFIED: ICD-10-CM

## 2020-09-10 DIAGNOSIS — H92.01 OTALGIA, RIGHT EAR: ICD-10-CM

## 2020-09-10 PROCEDURE — 99204 OFFICE O/P NEW MOD 45 MIN: CPT | Mod: 25

## 2020-09-10 PROCEDURE — 31575 DIAGNOSTIC LARYNGOSCOPY: CPT

## 2020-09-10 PROCEDURE — 69210 REMOVE IMPACTED EAR WAX UNI: CPT

## 2020-09-10 RX ORDER — ASPIRIN 81 MG
81 TABLET, DELAYED RELEASE (ENTERIC COATED) ORAL
Refills: 0 | Status: ACTIVE | COMMUNITY

## 2020-09-10 RX ORDER — MEPROBAMATE 200 MG
200 TABLET ORAL
Refills: 0 | Status: ACTIVE | COMMUNITY

## 2020-09-10 RX ORDER — METHYLPREDNISOLONE 4 MG/1
4 TABLET ORAL
Qty: 21 | Refills: 1 | Status: ACTIVE | COMMUNITY
Start: 2020-09-10 | End: 1900-01-01

## 2020-09-10 NOTE — ASSESSMENT
[FreeTextEntry1] : Vocal cord nodules taking off many years ago by Dr. 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  I have placed her on a Medrol Dosepak told her to speak to her endocrinologist to monitor her sugars and she will follow-up and see us in a month if there is no total resolution consideration for biopsy and referral to Dr. Roldan will be considered.

## 2020-09-10 NOTE — HISTORY OF PRESENT ILLNESS
[de-identified] : Patients family member states that since [facility] she has had a raspy voice that comes and goes. SHe does have a baseline hoarseness but it has sounded worse recently. 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 She does not have any pain in the throat and is able to drink and eat without issue. SHe used to sing a lot but has not been singing in the least 6 months or so. She also states that she has itching in the right ear with a minor tenderness as well that comes and goes. She does not have any ringing int he ears or dizziness. Patients family here with her today does admit that patient speaks loudly on a regular basis.

## 2020-10-09 ENCOUNTER — APPOINTMENT (OUTPATIENT)
Dept: ENDOCRINOLOGY | Facility: CLINIC | Age: 70
End: 2020-10-09
Payer: MEDICARE

## 2020-10-09 VITALS
HEIGHT: 62 IN | HEART RATE: 61 BPM | WEIGHT: 128 LBS | SYSTOLIC BLOOD PRESSURE: 100 MMHG | DIASTOLIC BLOOD PRESSURE: 60 MMHG | OXYGEN SATURATION: 95 % | BODY MASS INDEX: 23.55 KG/M2 | TEMPERATURE: 98.4 F

## 2020-10-09 LAB
GLUCOSE BLDC GLUCOMTR-MCNC: 157
HBA1C MFR BLD HPLC: 8

## 2020-10-09 PROCEDURE — 83036 HEMOGLOBIN GLYCOSYLATED A1C: CPT | Mod: QW

## 2020-10-09 PROCEDURE — 95251 CONT GLUC MNTR ANALYSIS I&R: CPT

## 2020-10-11 NOTE — ASSESSMENT
[FreeTextEntry1] : 70 y.o. female with h/o Type 1 DM, HTN, hyperlipidemia, hypothyroidism and osteoporosis. \par 1. Type 1 DM- Fair control with Hba1c of 8% today. Encouraged carbohydrate consistent diet. Dexcom CGMS download reviewed. Will continue Tresiba 22 units daily and will continue Humalog 5 units before breakfast and  7 units before lunch and will decrease to 6 units before dinner. Also encouraged modifying bedtime snack. If bedtime blood glucose is below at 150 or below, may need to reduce Tresiba. Encouraged patient to keep in touch with blood glucose readings. Will continue with Dexcom G6 for better accuracy and safety given patient now has less hypoglycemia. Stable CMP and urine microalb/cr ratio. \par 2. HTN- BP is at goal and will continue medications\par 3. Hyperlipidemia- Lipids are at goal and will continue statin\par 4. Hypothyroidism/Hashimoto's disease- Patient is euthyroid.  Will continue LT4 75 mcg daily. \par 5. Osteoporosis- DEXA scan performed in July 2020 shows spine -0.9 which is stable with arthritis, left femoral neck -1.9 which is stable and total hip -2.0 which is stable and 1/3 radius -3.5 which is stable. Given increased risk of fracture, will continue Alendronate 70 mg po weekly. Encouraged weight bearing activity. Discussed appropriate calcium and vitamin D intake. Will continue vitamin D3 2,000 IU daily.  Will repeat DEXA scan in 7/2022.\par \par Follow up in 3 months\par Already received flu vaccine

## 2020-10-11 NOTE — HISTORY OF PRESENT ILLNESS
[FreeTextEntry1] : 70 y.o. female with h/o Type 1 1/2 DM diagnosed in 2009, osteoporosis, and hypothyroidism here for follow up visit. 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  Using Dexcom now and happy with it. On basal bolus regimen. Less hypoglycemia. Taking Tresiba 22 units daily because when decreased Tresiba to 20 units developed fasting hyperglycemia. Takes Humalog 6 units before breakfast, 7 units before lunch and 7 units before dinner. Does have evening snack which includes 1/2 apple with no protein and typically does not cover with insulin. No complications. UTD with opthalmology and no retinopathy.  No foot complaints. No proteinuria. UTD with cardiology and had normal stress test. Reports weight is stable now. Had full GI work up for weight loss which was normal. No abdominal pain and no change in bowels. \par \par In regards to osteoporosis,  DEXA scan in July 2017 left femoral neck -2.2, total hip -2.0 and 1/3 radius -3.6 and spine -1.3 is falsely elevated. DEXA scan performed in July 2018 shows spine -1.1 which is stable with arthritis, left femoral neck -2.1 which is stable and total hip -2.0 which is stable and 1/3 radius -3.6 which is stable.  DEXA scan performed in July 2020 shows spine -0.9 which is stable with arthritis, left femoral neck -1.9 which is stable and total hip -2.0 which is stable and 1/3 radius -3.5 which is stable. Taking Alendronate 70 mg weekly since July 2017. Working with dentist and had implants in October 2019. Takes calcium 500 mg daily and vitamin D 2,000 Iu daily. No falls or fractures. \par \par In regards to hypothyroidism, takes LT4 75 mcg daily. Feeling good. Reports weight is stable.

## 2020-10-11 NOTE — REVIEW OF SYSTEMS
[Fatigue] : no fatigue [Recent Weight Gain (___ Lbs)] : no recent weight gain [Recent Weight Loss (___ Lbs)] : no recent weight loss [Polyuria] : no polyuria [Swelling] : no swelling

## 2020-10-12 ENCOUNTER — RESULT REVIEW (OUTPATIENT)
Age: 70
End: 2020-10-12

## 2020-10-12 ENCOUNTER — OUTPATIENT (OUTPATIENT)
Dept: OUTPATIENT SERVICES | Facility: HOSPITAL | Age: 70
LOS: 1 days | End: 2020-10-12
Payer: MEDICARE

## 2020-10-12 ENCOUNTER — APPOINTMENT (OUTPATIENT)
Dept: MAMMOGRAPHY | Facility: IMAGING CENTER | Age: 70
End: 2020-10-12
Payer: MEDICARE

## 2020-10-12 ENCOUNTER — APPOINTMENT (OUTPATIENT)
Dept: ULTRASOUND IMAGING | Facility: IMAGING CENTER | Age: 70
End: 2020-10-12
Payer: MEDICARE

## 2020-10-12 DIAGNOSIS — N60.19 DIFFUSE CYSTIC MASTOPATHY OF UNSPECIFIED BREAST: ICD-10-CM

## 2020-10-12 PROCEDURE — 77063 BREAST TOMOSYNTHESIS BI: CPT | Mod: 26

## 2020-10-12 PROCEDURE — 77067 SCR MAMMO BI INCL CAD: CPT | Mod: 26

## 2020-10-12 PROCEDURE — 77063 BREAST TOMOSYNTHESIS BI: CPT

## 2020-10-12 PROCEDURE — 77067 SCR MAMMO BI INCL CAD: CPT

## 2020-10-12 PROCEDURE — 76641 ULTRASOUND BREAST COMPLETE: CPT

## 2020-10-12 PROCEDURE — 76641 ULTRASOUND BREAST COMPLETE: CPT | Mod: 26,50

## 2020-10-15 ENCOUNTER — APPOINTMENT (OUTPATIENT)
Dept: OTOLARYNGOLOGY | Facility: CLINIC | Age: 70
End: 2020-10-15
Payer: MEDICARE

## 2020-10-15 VITALS
BODY MASS INDEX: 23.55 KG/M2 | TEMPERATURE: 98 F | HEIGHT: 62 IN | SYSTOLIC BLOOD PRESSURE: 128 MMHG | DIASTOLIC BLOOD PRESSURE: 74 MMHG | HEART RATE: 70 BPM | WEIGHT: 128 LBS

## 2020-10-15 PROCEDURE — 31575 DIAGNOSTIC LARYNGOSCOPY: CPT

## 2020-10-15 PROCEDURE — 99214 OFFICE O/P EST MOD 30 MIN: CPT | Mod: 25

## 2020-10-15 PROCEDURE — 99213 OFFICE O/P EST LOW 20 MIN: CPT | Mod: 25

## 2020-10-15 NOTE — ASSESSMENT
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  Everything seems to have healed.  Patient is feeling better no further acute interventions indicated however I will have her come back and see us in 3 months.

## 2020-10-15 NOTE — HISTORY OF PRESENT ILLNESS
[de-identified] : patient took the steroid as directed at the last visit and was in touch with her endocrinologist just in case. SHe feels like the voice is improved since the last visit. SHe does not have any throat pain and is still able to eat and drink okay. She feels like her voice is stronger and less raspy.

## 2020-11-14 ENCOUNTER — RX RENEWAL (OUTPATIENT)
Age: 70
End: 2020-11-14

## 2020-12-13 ENCOUNTER — RX RENEWAL (OUTPATIENT)
Age: 70
End: 2020-12-13

## 2021-01-22 ENCOUNTER — APPOINTMENT (OUTPATIENT)
Dept: ENDOCRINOLOGY | Facility: CLINIC | Age: 71
End: 2021-01-22
Payer: MEDICARE

## 2021-01-22 VITALS
OXYGEN SATURATION: 96 % | DIASTOLIC BLOOD PRESSURE: 68 MMHG | HEIGHT: 62 IN | HEART RATE: 74 BPM | BODY MASS INDEX: 23.37 KG/M2 | SYSTOLIC BLOOD PRESSURE: 114 MMHG | TEMPERATURE: 97.9 F | WEIGHT: 127 LBS

## 2021-01-22 LAB
GLUCOSE BLDC GLUCOMTR-MCNC: 196
HBA1C MFR BLD HPLC: 7.5

## 2021-01-22 PROCEDURE — 83036 HEMOGLOBIN GLYCOSYLATED A1C: CPT | Mod: QW

## 2021-01-22 PROCEDURE — 95251 CONT GLUC MNTR ANALYSIS I&R: CPT

## 2021-01-22 PROCEDURE — 99214 OFFICE O/P EST MOD 30 MIN: CPT | Mod: 25

## 2021-01-22 PROCEDURE — 99072 ADDL SUPL MATRL&STAF TM PHE: CPT

## 2021-01-22 NOTE — PHYSICAL EXAM
[Alert] : alert [No Acute Distress] : no acute distress [Normal Sclera/Conjunctiva] : normal sclera/conjunctiva [EOMI] : extra ocular movement intact [No LAD] : no lymphadenopathy [Thyroid Not Enlarged] : the thyroid was not enlarged [No Thyroid Nodules] : no palpable thyroid nodules [No Respiratory Distress] : no respiratory distress [Clear to Auscultation] : lungs were clear to auscultation bilaterally [Normal S1, S2] : normal S1 and S2 [Regular Rhythm] : with a regular rhythm [No Edema] : no peripheral edema [Pedal Pulses Normal] : the pedal pulses are present [Normal Bowel Sounds] : normal bowel sounds [Not Tender] : non-tender [Not Distended] : not distended [Soft] : abdomen soft [Normal Anterior Cervical Nodes] : no anterior cervical lymphadenopathy [No Spinal Tenderness] : no spinal tenderness [Kyphosis] : kyphosis present [No Clubbing, Cyanosis] : no clubbing  or cyanosis of the fingernails [No Rash] : no rash [Right Foot Was Examined] : right foot ~C was examined [Left Foot Was Examined] : left foot ~C was examined [Normal] : normal [2+] : 2+ in the dorsalis pedis [Diminished Throughout Both Feet] : diminished tactile sensation with monofilament testing throughout both feet [Normal Reflexes] : deep tendon reflexes were 2+ and symmetric [Normal Affect] : the affect was normal [Normal Mood] : the mood was normal [FreeTextEntry1] : bunion and callus [de-identified] : 2/6 DANIEL [FreeTextEntry5] : bunion

## 2021-01-22 NOTE — ASSESSMENT
[Carbohydrate Consistent Diet] : carbohydrate consistent diet [Hypoglycemia Management] : hypoglycemia management [Diabetes Foot Care] : diabetes foot care [Bisphosphonate Therapy] : Risks  and benefits of bisphosphonate therapy were  discussed with the patient including gastroesophageal irritation, osteonecrosis of the jaw, and atypical femur fractures, and acute phase reaction [Levothyroxine] : The patient was instructed to take Levothyroxine on an empty stomach, separate from vitamins, and wait at least 30 minutes before eating [FreeTextEntry1] : 70 y.o. female with h/o Type 1 DM, HTN, hyperlipidemia, hypothyroidism and osteoporosis. \par \par 1. Type 1 DM- Fair control with Hba1c of 7.5% today. Encouraged carbohydrate consistent diet. Dexcom CGMS download reviewed. Will decrease Tresiba to 21 units daily and will continue Humalog 6 units before breakfast and  7 units before lunch and will continue 7 units before dinner. Also encouraged treating bedtime snack with Humalog 2 units. Encouraged patient to keep in touch with blood glucose readings. Will continue with Dexcom G6 for better accuracy and safety given patient now has less hypoglycemia. Will check CMP and urine microalb/cr ratio. \par \par 2. HTN- BP is at goal and will continue medications\par \par 3. Hyperlipidemia- Will check lipids and will continue statin\par \par 4. Hypothyroidism/Hashimoto's disease- Patient appears euthyroid.  Will continue LT4 75 mcg daily. Will check TFTs.\par \par 5. Osteoporosis- DEXA scan performed in July 2020 shows spine -0.9 which is stable with arthritis, left femoral neck -1.9 which is stable and total hip -2.0 which is stable and 1/3 radius -3.5 which is stable. Given increased risk of fracture, will continue Alendronate 70 mg po weekly. Encouraged weight bearing activity. Discussed appropriate calcium and vitamin D intake. Will continue vitamin D3 2,000 IU daily.  Will repeat DEXA scan in 7/2022.\par \par Follow up in 3 months\par Already received flu vaccine

## 2021-01-22 NOTE — HISTORY OF PRESENT ILLNESS
[Hypoglycemia] : hypoglycemic [FreeTextEntry1] : 70 y.o. female with h/o Type 1 1/2 DM diagnosed in 2009, osteoporosis, and hypothyroidism here for follow up visit. 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  Using Dexcom now and happy with it. On basal bolus regimen. Less hypoglycemia. Taking Tresiba 22 units daily because when decreased Tresiba to 20 units developed fasting hyperglycemia. Takes Humalog 6 units before breakfast, 7 units before lunch and 7 units before dinner. Does have evening snack which includes 1/2 apple with no protein and typically does not cover with insulin. No complications. UTD with opthalmology and no retinopathy.  No foot complaints. No proteinuria. UTD with cardiology and had normal stress test. Reports weight is stable now. Had full GI work up for weight loss which was normal. No abdominal pain and no change in bowels. \par \par In regards to osteoporosis,  DEXA scan in July 2017 left femoral neck -2.2, total hip -2.0 and 1/3 radius -3.6 and spine -1.3 is falsely elevated. DEXA scan performed in July 2018 shows spine -1.1 which is stable with arthritis, left femoral neck -2.1 which is stable and total hip -2.0 which is stable and 1/3 radius -3.6 which is stable.  DEXA scan performed in July 2020 shows spine -0.9 which is stable with arthritis, left femoral neck -1.9 which is stable and total hip -2.0 which is stable and 1/3 radius -3.5 which is stable. Taking Alendronate 70 mg weekly since July 2017. Working with dentist and had implants in October 2019. Takes calcium 600 mg daily and vitamin D 2,000 Iu daily. No falls or fractures. \par \par In regards to hypothyroidism, takes LT4 75 mcg daily. Feeling good. Reports weight is stable.

## 2021-01-22 NOTE — REVIEW OF SYSTEMS
[Hair Loss] : hair loss [Pain/Numbness of Digits] : pain/numbness of digits [Anxiety] : anxiety [Stress] : stress [Negative] : Endocrine [Fatigue] : no fatigue [Recent Weight Gain (___ Lbs)] : no recent weight gain [Recent Weight Loss (___ Lbs)] : no recent weight loss [Polyuria] : no polyuria [Swelling] : no swelling

## 2021-01-25 LAB
25(OH)D3 SERPL-MCNC: 48.3 NG/ML
ALBUMIN SERPL ELPH-MCNC: 4.1 G/DL
ALP BLD-CCNC: 74 U/L
ALT SERPL-CCNC: 9 U/L
ANION GAP SERPL CALC-SCNC: 12 MMOL/L
AST SERPL-CCNC: 19 U/L
BASOPHILS # BLD AUTO: 0.05 K/UL
BASOPHILS NFR BLD AUTO: 0.9 %
BILIRUB SERPL-MCNC: 0.4 MG/DL
BUN SERPL-MCNC: 15 MG/DL
CALCIUM SERPL-MCNC: 9.6 MG/DL
CHLORIDE SERPL-SCNC: 103 MMOL/L
CHOLEST SERPL-MCNC: 143 MG/DL
CO2 SERPL-SCNC: 26 MMOL/L
CREAT SERPL-MCNC: 0.79 MG/DL
CREAT SPEC-SCNC: 30 MG/DL
EOSINOPHIL # BLD AUTO: 0.29 K/UL
EOSINOPHIL NFR BLD AUTO: 4.9 %
GLUCOSE SERPL-MCNC: 213 MG/DL
HCT VFR BLD CALC: 40.5 %
HDLC SERPL-MCNC: 70 MG/DL
HGB BLD-MCNC: 13.8 G/DL
IMM GRANULOCYTES NFR BLD AUTO: 0.3 %
LDLC SERPL CALC-MCNC: 44 MG/DL
LYMPHOCYTES # BLD AUTO: 2.01 K/UL
LYMPHOCYTES NFR BLD AUTO: 34.2 %
MAN DIFF?: NORMAL
MCHC RBC-ENTMCNC: 33.2 PG
MCHC RBC-ENTMCNC: 34.1 GM/DL
MCV RBC AUTO: 97.4 FL
MICROALBUMIN 24H UR DL<=1MG/L-MCNC: <1.2 MG/DL
MICROALBUMIN/CREAT 24H UR-RTO: NORMAL MG/G
MONOCYTES # BLD AUTO: 0.45 K/UL
MONOCYTES NFR BLD AUTO: 7.7 %
NEUTROPHILS # BLD AUTO: 3.06 K/UL
NEUTROPHILS NFR BLD AUTO: 52 %
NONHDLC SERPL-MCNC: 72 MG/DL
PLATELET # BLD AUTO: 196 K/UL
POTASSIUM SERPL-SCNC: 4.2 MMOL/L
PROT SERPL-MCNC: 6.5 G/DL
RBC # BLD: 4.16 M/UL
RBC # FLD: 13.2 %
SODIUM SERPL-SCNC: 140 MMOL/L
T4 FREE SERPL-MCNC: 1.3 NG/DL
TRIGL SERPL-MCNC: 142 MG/DL
TSH SERPL-ACNC: 2.11 UIU/ML
WBC # FLD AUTO: 5.88 K/UL

## 2021-02-19 ENCOUNTER — RX RENEWAL (OUTPATIENT)
Age: 71
End: 2021-02-19

## 2021-03-12 ENCOUNTER — RX RENEWAL (OUTPATIENT)
Age: 71
End: 2021-03-12

## 2021-05-05 ENCOUNTER — RESULT CHARGE (OUTPATIENT)
Age: 71
End: 2021-05-05

## 2021-05-05 ENCOUNTER — APPOINTMENT (OUTPATIENT)
Dept: ENDOCRINOLOGY | Facility: CLINIC | Age: 71
End: 2021-05-05
Payer: MEDICARE

## 2021-05-05 VITALS
TEMPERATURE: 98.1 F | HEIGHT: 62 IN | WEIGHT: 127 LBS | HEART RATE: 69 BPM | DIASTOLIC BLOOD PRESSURE: 60 MMHG | BODY MASS INDEX: 23.37 KG/M2 | OXYGEN SATURATION: 97 % | SYSTOLIC BLOOD PRESSURE: 120 MMHG

## 2021-05-05 LAB — GLUCOSE BLDC GLUCOMTR-MCNC: 152

## 2021-05-05 PROCEDURE — 83036 HEMOGLOBIN GLYCOSYLATED A1C: CPT | Mod: QW

## 2021-05-05 PROCEDURE — 95251 CONT GLUC MNTR ANALYSIS I&R: CPT

## 2021-05-05 PROCEDURE — 99072 ADDL SUPL MATRL&STAF TM PHE: CPT

## 2021-05-05 PROCEDURE — 99214 OFFICE O/P EST MOD 30 MIN: CPT | Mod: 25

## 2021-05-05 NOTE — ASSESSMENT
[Carbohydrate Consistent Diet] : carbohydrate consistent diet [Hypoglycemia Management] : hypoglycemia management [Diabetes Foot Care] : diabetes foot care [Bisphosphonate Therapy] : Risks  and benefits of bisphosphonate therapy were  discussed with the patient including gastroesophageal irritation, osteonecrosis of the jaw, and atypical femur fractures, and acute phase reaction [Levothyroxine] : The patient was instructed to take Levothyroxine on an empty stomach, separate from vitamins, and wait at least 30 minutes before eating [FreeTextEntry1] : 71 y.o. female with h/o Type 1 DM, HTN, hyperlipidemia, hypothyroidism and osteoporosis. \par \par 1. Type 1 DM- Fair control with Hba1c of 7.3% today. Encouraged carbohydrate consistent diet. Dexcom CGMS download reviewed. Will continue Tresiba 22 units daily and will continue Humalog 6 units before breakfast and  7 units before lunch and will  increase to 8 units before dinner. Also encouraged treating bedtime snack with Humalog 2 units. Encouraged patient to keep in touch with blood glucose readings. Will continue with Dexcom G6 for better accuracy and safety given patient now has less hypoglycemia. Stable CMP and urine microalb/cr ratio in 1/2021. \par \par 2. HTN- BP is at goal and will continue medications\par \par 3. Hyperlipidemia- Lipids are at goal and will continue statin\par \par 4. Hypothyroidism/Hashimoto's disease- Patient is euthyroid.  Will continue LT4 75 mcg daily. \par \par 5. Osteoporosis- DEXA scan performed in July 2020 shows spine -0.9 which is stable with arthritis, left femoral neck -1.9 which is stable and total hip -2.0 which is stable and 1/3 radius -3.5 which is stable. Given increased risk of fracture, will continue Alendronate 70 mg po weekly. Encouraged weight bearing activity. Discussed appropriate calcium and vitamin D intake. Will continue vitamin D3 2,000 IU daily.  Will repeat DEXA scan in 7/2022 and will plan for drug holiday.\par \par Follow up in 3 months

## 2021-05-05 NOTE — HISTORY OF PRESENT ILLNESS
[Hypoglycemia] : hypoglycemic [FreeTextEntry1] : 71 y.o. female with h/o Type 1 1/2 DM diagnosed in 2009, osteoporosis, and hypothyroidism here for follow up visit. 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  Using Dexcom now and happy with it. On basal bolus regimen. Less hypoglycemia. Taking Tresiba 22 units daily because when decreased Tresiba to 20 units developed fasting hyperglycemia. Takes Humalog 6 units before breakfast, 7 units before lunch and 7 to 8 units before dinner. Does have evening snack which includes 1/2 apple with no protein and typically does not cover with insulin. No complications. UTD with opthalmology and no retinopathy.  No foot complaints. No proteinuria. UTD with cardiology and had normal stress test. Reports weight is stable now. Had full GI work up for weight loss which was normal. No abdominal pain and no change in bowels. \par \par In regards to osteoporosis,  DEXA scan in July 2017 left femoral neck -2.2, total hip -2.0 and 1/3 radius -3.6 and spine -1.3 is falsely elevated. DEXA scan performed in July 2018 shows spine -1.1 which is stable with arthritis, left femoral neck -2.1 which is stable and total hip -2.0 which is stable and 1/3 radius -3.6 which is stable.  DEXA scan performed in July 2020 shows spine -0.9 which is stable with arthritis, left femoral neck -1.9 which is stable and total hip -2.0 which is stable and 1/3 radius -3.5 which is stable. Taking Alendronate 70 mg weekly since July 2017. Working with dentist and had implants in October 2019. Takes calcium 600 mg daily and vitamin D 2,000 Iu daily. No falls or fractures. \par \par In regards to hypothyroidism, takes LT4 75 mcg daily. Feeling good. Reports weight is stable.

## 2021-05-05 NOTE — PHYSICAL EXAM
[Alert] : alert [No Acute Distress] : no acute distress [Normal Sclera/Conjunctiva] : normal sclera/conjunctiva [EOMI] : extra ocular movement intact [No LAD] : no lymphadenopathy [Thyroid Not Enlarged] : the thyroid was not enlarged [No Thyroid Nodules] : no palpable thyroid nodules [No Respiratory Distress] : no respiratory distress [Clear to Auscultation] : lungs were clear to auscultation bilaterally [Normal S1, S2] : normal S1 and S2 [Regular Rhythm] : with a regular rhythm [No Edema] : no peripheral edema [Pedal Pulses Normal] : the pedal pulses are present [Normal Bowel Sounds] : normal bowel sounds [Not Tender] : non-tender [Not Distended] : not distended [Soft] : abdomen soft [Normal Anterior Cervical Nodes] : no anterior cervical lymphadenopathy [No Spinal Tenderness] : no spinal tenderness [Kyphosis] : kyphosis present [No Clubbing, Cyanosis] : no clubbing  or cyanosis of the fingernails [No Rash] : no rash [Right Foot Was Examined] : right foot ~C was examined [Left Foot Was Examined] : left foot ~C was examined [Normal] : normal [2+] : 2+ in the dorsalis pedis [Diminished Throughout Both Feet] : diminished tactile sensation with monofilament testing throughout both feet [Normal Reflexes] : deep tendon reflexes were 2+ and symmetric [Normal Affect] : the affect was normal [Normal Mood] : the mood was normal [de-identified] : 2/6 DANIEL [FreeTextEntry1] : bunion and callus [FreeTextEntry5] : bunion

## 2021-05-07 LAB — HBA1C MFR BLD HPLC: 7.3

## 2021-07-18 ENCOUNTER — RX RENEWAL (OUTPATIENT)
Age: 71
End: 2021-07-18

## 2021-09-03 ENCOUNTER — APPOINTMENT (OUTPATIENT)
Dept: ENDOCRINOLOGY | Facility: CLINIC | Age: 71
End: 2021-09-03
Payer: MEDICARE

## 2021-09-03 VITALS
HEART RATE: 65 BPM | BODY MASS INDEX: 23.96 KG/M2 | WEIGHT: 131 LBS | SYSTOLIC BLOOD PRESSURE: 110 MMHG | DIASTOLIC BLOOD PRESSURE: 62 MMHG | OXYGEN SATURATION: 98 % | TEMPERATURE: 98 F

## 2021-09-03 LAB — HBA1C MFR BLD HPLC: 8

## 2021-09-03 PROCEDURE — 95251 CONT GLUC MNTR ANALYSIS I&R: CPT

## 2021-09-03 PROCEDURE — 99215 OFFICE O/P EST HI 40 MIN: CPT | Mod: 25

## 2021-09-03 PROCEDURE — 83036 HEMOGLOBIN GLYCOSYLATED A1C: CPT | Mod: QW

## 2021-09-04 RX ORDER — IBUPROFEN 600 MG/1
600 TABLET, FILM COATED ORAL
Qty: 21 | Refills: 0 | Status: DISCONTINUED | COMMUNITY
Start: 2021-05-17

## 2021-09-04 RX ORDER — AMOXICILLIN AND CLAVULANATE POTASSIUM 500; 125 MG/1; MG/1
500-125 TABLET, FILM COATED ORAL
Qty: 14 | Refills: 0 | Status: DISCONTINUED | COMMUNITY
Start: 2021-05-17

## 2021-09-04 RX ORDER — CHLORHEXIDINE GLUCONATE, 0.12% ORAL RINSE 1.2 MG/ML
0.12 SOLUTION DENTAL
Qty: 473 | Refills: 0 | Status: DISCONTINUED | COMMUNITY
Start: 2021-05-17

## 2021-09-04 NOTE — HISTORY OF PRESENT ILLNESS
[Hypoglycemia] : hypoglycemic [FreeTextEntry1] : 71 y.o. female with h/o Type 1 1/2 DM diagnosed in 2009, osteoporosis, and hypothyroidism here for follow up visit. 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  Using Dexcom now and happy with it. On basal bolus regimen. Less hypoglycemia. Taking Tresiba 22 units daily because when decreased Tresiba to 20 units developed fasting hyperglycemia. Takes Humalog 6 units before breakfast, 6 to 7 units before lunch and 7 to 8 units before dinner. Does have evening snack which includes 1/2 apple with no protein and typically does not cover with insulin. No complications. UTD with opthalmology and no retinopathy.  No foot complaints. No proteinuria. UTD with cardiology and had normal stress test. Reports weight is stable now. Had full GI work up for weight loss which was normal. No abdominal pain and no change in bowels. \par \par In regards to osteoporosis,  DEXA scan in July 2017 left femoral neck -2.2, total hip -2.0 and 1/3 radius -3.6 and spine -1.3 is falsely elevated. DEXA scan performed in July 2018 shows spine -1.1 which is stable with arthritis, left femoral neck -2.1 which is stable and total hip -2.0 which is stable and 1/3 radius -3.6 which is stable.  DEXA scan performed in July 2020 shows spine -0.9 which is stable with arthritis, left femoral neck -1.9 which is stable and total hip -2.0 which is stable and 1/3 radius -3.5 which is stable. Taking Alendronate 70 mg weekly since July 2017. Follows with dentist and had implants in October 2019. Takes calcium 600 mg daily and vitamin D 2,000 Iu daily. No falls or fractures. \par \par In regards to hypothyroidism, takes LT4 75 mcg daily. Feeling good. Reports weight is stable.

## 2021-09-04 NOTE — PHYSICAL EXAM
[Alert] : alert [No Acute Distress] : no acute distress [Normal Sclera/Conjunctiva] : normal sclera/conjunctiva [EOMI] : extra ocular movement intact [No LAD] : no lymphadenopathy [Thyroid Not Enlarged] : the thyroid was not enlarged [No Thyroid Nodules] : no palpable thyroid nodules [No Respiratory Distress] : no respiratory distress [Clear to Auscultation] : lungs were clear to auscultation bilaterally [Normal S1, S2] : normal S1 and S2 [Regular Rhythm] : with a regular rhythm [No Edema] : no peripheral edema [Pedal Pulses Normal] : the pedal pulses are present [Normal Bowel Sounds] : normal bowel sounds [Not Tender] : non-tender [Not Distended] : not distended [Soft] : abdomen soft [Normal Anterior Cervical Nodes] : no anterior cervical lymphadenopathy [No Spinal Tenderness] : no spinal tenderness [Kyphosis] : kyphosis present [No Clubbing, Cyanosis] : no clubbing  or cyanosis of the fingernails [No Rash] : no rash [Right Foot Was Examined] : right foot ~C was examined [Left Foot Was Examined] : left foot ~C was examined [Normal] : normal [2+] : 2+ in the dorsalis pedis [Diminished Throughout Both Feet] : diminished tactile sensation with monofilament testing throughout both feet [Normal Reflexes] : deep tendon reflexes were 2+ and symmetric [Normal Affect] : the affect was normal [Normal Mood] : the mood was normal [de-identified] : 2/6 DANIEL [FreeTextEntry1] : bunion and callus [FreeTextEntry5] : bunion

## 2021-09-04 NOTE — ASSESSMENT
[Carbohydrate Consistent Diet] : carbohydrate consistent diet [Hypoglycemia Management] : hypoglycemia management [Diabetes Foot Care] : diabetes foot care [Bisphosphonate Therapy] : Risks  and benefits of bisphosphonate therapy were  discussed with the patient including gastroesophageal irritation, osteonecrosis of the jaw, and atypical femur fractures, and acute phase reaction [Levothyroxine] : The patient was instructed to take Levothyroxine on an empty stomach, separate from vitamins, and wait at least 30 minutes before eating [FreeTextEntry1] : 71 y.o. female with h/o Type 1 DM, HTN, hyperlipidemia, hypothyroidism and osteoporosis. \par \par 1. Type 1 DM- Fair control with Hba1c of 8% today. Encouraged carbohydrate consistent diet. Dexcom CGMS download reviewed. Will continue Tresiba 22 units daily and will continue Humalog 6 units before breakfast and  7 units before lunch and will increase to 8 to 9 units before dinner. Also encouraged treating bedtime snack with Humalog 2 units if carb amount is greater than 15 grams. Encouraged patient to keep in touch with blood glucose readings. Will continue with Dexcom G6 for better accuracy and safety given patient now has less hypoglycemia. Will check CMP and urine microalb/cr ratio. \par \par 2. HTN- BP is at goal and will continue medications\par \par 3. Hyperlipidemia- Will check lipids and will continue statin\par \par 4. Hypothyroidism/Hashimoto's disease- Patient appears euthyroid. Will check TFTs.  Will continue LT4 75 mcg daily. \par \par 5. Osteoporosis- DEXA scan performed in July 2020 shows spine -0.9 which is stable with arthritis, left femoral neck -1.9 which is stable and total hip -2.0 which is stable and 1/3 radius -3.5 which is stable. Given increased risk of fracture, will continue Alendronate 70 mg po weekly. Encouraged weight bearing activity. Discussed appropriate calcium and vitamin D intake. Will continue vitamin D3 2,000 IU daily.  Will repeat DEXA scan in 7/2022 and will plan for drug holiday.\par \par Follow up in 3 months [Bisphosphonates] : The patient was instructed to take bisphosphonates on an empty stomach with a full glass of water,and wait at least 30 minutes before eating or lying down

## 2021-09-07 LAB
25(OH)D3 SERPL-MCNC: 40.6 NG/ML
ALBUMIN SERPL ELPH-MCNC: 4.1 G/DL
ALP BLD-CCNC: 72 U/L
ALT SERPL-CCNC: 15 U/L
ANION GAP SERPL CALC-SCNC: 10 MMOL/L
AST SERPL-CCNC: 20 U/L
BASOPHILS # BLD AUTO: 0.05 K/UL
BASOPHILS NFR BLD AUTO: 0.8 %
BILIRUB SERPL-MCNC: 0.6 MG/DL
BUN SERPL-MCNC: 13 MG/DL
CALCIUM SERPL-MCNC: 9.1 MG/DL
CHLORIDE SERPL-SCNC: 105 MMOL/L
CHOLEST SERPL-MCNC: 135 MG/DL
CO2 SERPL-SCNC: 24 MMOL/L
CREAT SERPL-MCNC: 0.62 MG/DL
CREAT SPEC-SCNC: 73 MG/DL
EOSINOPHIL # BLD AUTO: 0.24 K/UL
EOSINOPHIL NFR BLD AUTO: 3.7 %
GLUCOSE SERPL-MCNC: 300 MG/DL
HCT VFR BLD CALC: 39.2 %
HDLC SERPL-MCNC: 68 MG/DL
HGB BLD-MCNC: 13.4 G/DL
IMM GRANULOCYTES NFR BLD AUTO: 0.5 %
LDLC SERPL CALC-MCNC: 40 MG/DL
LYMPHOCYTES # BLD AUTO: 1.89 K/UL
LYMPHOCYTES NFR BLD AUTO: 28.8 %
MAN DIFF?: NORMAL
MCHC RBC-ENTMCNC: 32.5 PG
MCHC RBC-ENTMCNC: 34.2 GM/DL
MCV RBC AUTO: 95.1 FL
MICROALBUMIN 24H UR DL<=1MG/L-MCNC: <1.2 MG/DL
MICROALBUMIN/CREAT 24H UR-RTO: NORMAL MG/G
MONOCYTES # BLD AUTO: 0.61 K/UL
MONOCYTES NFR BLD AUTO: 9.3 %
NEUTROPHILS # BLD AUTO: 3.74 K/UL
NEUTROPHILS NFR BLD AUTO: 56.9 %
NONHDLC SERPL-MCNC: 67 MG/DL
PLATELET # BLD AUTO: 176 K/UL
POTASSIUM SERPL-SCNC: 4.4 MMOL/L
PROT SERPL-MCNC: 6.3 G/DL
RBC # BLD: 4.12 M/UL
RBC # FLD: 12.8 %
SODIUM SERPL-SCNC: 138 MMOL/L
T4 FREE SERPL-MCNC: 1.4 NG/DL
TRIGL SERPL-MCNC: 136 MG/DL
TSH SERPL-ACNC: 2.16 UIU/ML
WBC # FLD AUTO: 6.56 K/UL

## 2021-10-13 ENCOUNTER — OUTPATIENT (OUTPATIENT)
Dept: OUTPATIENT SERVICES | Facility: HOSPITAL | Age: 71
LOS: 1 days | End: 2021-10-13
Payer: MEDICARE

## 2021-10-13 ENCOUNTER — APPOINTMENT (OUTPATIENT)
Dept: ULTRASOUND IMAGING | Facility: IMAGING CENTER | Age: 71
End: 2021-10-13
Payer: MEDICARE

## 2021-10-13 ENCOUNTER — APPOINTMENT (OUTPATIENT)
Dept: MAMMOGRAPHY | Facility: IMAGING CENTER | Age: 71
End: 2021-10-13
Payer: MEDICARE

## 2021-10-13 DIAGNOSIS — Z00.8 ENCOUNTER FOR OTHER GENERAL EXAMINATION: ICD-10-CM

## 2021-10-13 PROCEDURE — 77067 SCR MAMMO BI INCL CAD: CPT | Mod: 26

## 2021-10-13 PROCEDURE — 77063 BREAST TOMOSYNTHESIS BI: CPT | Mod: 26

## 2021-10-13 PROCEDURE — 77067 SCR MAMMO BI INCL CAD: CPT

## 2021-10-13 PROCEDURE — 76641 ULTRASOUND BREAST COMPLETE: CPT | Mod: 26,50

## 2021-10-13 PROCEDURE — 77063 BREAST TOMOSYNTHESIS BI: CPT

## 2021-10-13 PROCEDURE — 76641 ULTRASOUND BREAST COMPLETE: CPT

## 2021-11-10 ENCOUNTER — RX RENEWAL (OUTPATIENT)
Age: 71
End: 2021-11-10

## 2021-11-11 ENCOUNTER — RX RENEWAL (OUTPATIENT)
Age: 71
End: 2021-11-11

## 2021-12-07 ENCOUNTER — RX RENEWAL (OUTPATIENT)
Age: 71
End: 2021-12-07

## 2021-12-10 ENCOUNTER — APPOINTMENT (OUTPATIENT)
Dept: ENDOCRINOLOGY | Facility: CLINIC | Age: 71
End: 2021-12-10
Payer: MEDICARE

## 2021-12-10 VITALS
OXYGEN SATURATION: 95 % | HEIGHT: 62 IN | SYSTOLIC BLOOD PRESSURE: 120 MMHG | TEMPERATURE: 98.1 F | WEIGHT: 128 LBS | BODY MASS INDEX: 23.55 KG/M2 | HEART RATE: 73 BPM | DIASTOLIC BLOOD PRESSURE: 64 MMHG

## 2021-12-10 LAB
GLUCOSE BLDC GLUCOMTR-MCNC: 183
HBA1C MFR BLD HPLC: 7.5

## 2021-12-10 PROCEDURE — 95251 CONT GLUC MNTR ANALYSIS I&R: CPT

## 2021-12-10 PROCEDURE — 99215 OFFICE O/P EST HI 40 MIN: CPT | Mod: 25

## 2021-12-10 PROCEDURE — 83036 HEMOGLOBIN GLYCOSYLATED A1C: CPT | Mod: QW

## 2021-12-10 NOTE — PHYSICAL EXAM
[Alert] : alert [No Acute Distress] : no acute distress [Normal Sclera/Conjunctiva] : normal sclera/conjunctiva [EOMI] : extra ocular movement intact [No LAD] : no lymphadenopathy [Thyroid Not Enlarged] : the thyroid was not enlarged [No Thyroid Nodules] : no palpable thyroid nodules [No Respiratory Distress] : no respiratory distress [Clear to Auscultation] : lungs were clear to auscultation bilaterally [Normal S1, S2] : normal S1 and S2 [Regular Rhythm] : with a regular rhythm [No Edema] : no peripheral edema [Pedal Pulses Normal] : the pedal pulses are present [Normal Bowel Sounds] : normal bowel sounds [Not Tender] : non-tender [Not Distended] : not distended [Soft] : abdomen soft [Normal Anterior Cervical Nodes] : no anterior cervical lymphadenopathy [No Spinal Tenderness] : no spinal tenderness [Kyphosis] : kyphosis present [No Clubbing, Cyanosis] : no clubbing  or cyanosis of the fingernails [No Rash] : no rash [Right foot was examined, including] : right foot ~C was examined, including visual inspection with sensory and pulse exams [Left foot was examined, including] : left foot ~C was examined, including visual inspection with sensory and pulse exams [Normal] : normal [2+] : 2+ in the dorsalis pedis [Diminished Throughout Both Feet] : diminished tactile sensation with monofilament testing throughout both feet [Normal Reflexes] : deep tendon reflexes were 2+ and symmetric [Normal Affect] : the affect was normal [Normal Mood] : the mood was normal [de-identified] : 2/6 DANIEL [FreeTextEntry1] : bunion and callus [FreeTextEntry5] : bunion

## 2021-12-10 NOTE — ASSESSMENT
[Carbohydrate Consistent Diet] : carbohydrate consistent diet [Hypoglycemia Management] : hypoglycemia management [Diabetes Foot Care] : diabetes foot care [Bisphosphonate Therapy] : Risks  and benefits of bisphosphonate therapy were  discussed with the patient including gastroesophageal irritation, osteonecrosis of the jaw, and atypical femur fractures, and acute phase reaction [FreeTextEntry1] : 71 y.o. female with h/o Type 1 DM, HTN, hyperlipidemia, hypothyroidism and osteoporosis. \par \par 1. Type 1 DM- Fair control with Hba1c of 7.5% today. Encouraged carbohydrate consistent diet. Dexcom CGMS download reviewed. Will continue Tresiba 22 units daily and will continue Humalog 6 units before breakfast and  7 units before lunch and will increase to 9 units before dinner. Also encouraged treating bedtime snack with Humalog 2 units if carb amount is greater than 15 grams. Encouraged patient to keep in touch with blood glucose readings. Will continue with Dexcom G6 for better accuracy and safety given patient now has less hypoglycemia. Stable CMP and urine microalb/cr ratio in 9/2021. \par \par 2. HTN- BP is at goal and will continue medications\par \par 3. Hyperlipidemia- Lipids are at goal and will continue statin\par \par 4. Hypothyroidism/Hashimoto's disease- Patient is euthyroid. Will continue LT4 75 mcg daily. \par \par 5. Osteoporosis- DEXA scan performed in July 2020 shows spine -0.9 which is stable with arthritis, left femoral neck -1.9 which is stable and total hip -2.0 which is stable and 1/3 radius -3.5 which is stable. Given increased risk of fracture, will continue Alendronate 70 mg po weekly. Encouraged weight bearing activity. Discussed appropriate calcium and vitamin D intake. Will continue vitamin D3 2,000 IU daily.  Will repeat DEXA scan in 7/2022 and will plan for drug holiday.\par \par Follow up in 3 months\par Follow up with podiatry

## 2021-12-10 NOTE — HISTORY OF PRESENT ILLNESS
[Hypoglycemia] : hypoglycemic [FreeTextEntry1] : 71 y.o. female with h/o Type 1 1/2 DM diagnosed in 2009, osteoporosis, and hypothyroidism here for follow up visit. Had Moderna COVID-19 vaccine X  plus booster. 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  Using Dexcom now and happy with it. On basal bolus regimen. Less hypoglycemia. Taking Tresiba 22 units daily because when decreased Tresiba to 20 units developed fasting hyperglycemia. Takes Humalog 6 units before breakfast, 7 units before lunch and 8 to 9 units before dinner but mostly take 8 units. Does have evening snack which includes 1/2 apple with no protein and typically does not cover with insulin. If FS is above 250 at bedtime, takes Humalog 1 unit. No complications. UTD with opthalmology and no retinopathy. No foot complaints. No proteinuria. UTD with cardiology and had normal stress test. Reports weight is stable now. Had full GI work up for weight loss which was normal. No abdominal pain and no change in bowels. \par \par In regards to osteoporosis,  DEXA scan in July 2017 left femoral neck -2.2, total hip -2.0 and 1/3 radius -3.6 and spine -1.3 is falsely elevated. DEXA scan performed in July 2018 shows spine -1.1 which is stable with arthritis, left femoral neck -2.1 which is stable and total hip -2.0 which is stable and 1/3 radius -3.6 which is stable.  DEXA scan performed in July 2020 shows spine -0.9 which is stable with arthritis, left femoral neck -1.9 which is stable and total hip -2.0 which is stable and 1/3 radius -3.5 which is stable. Taking Alendronate 70 mg weekly since July 2017. Follows with dentist and had implants in October 2019. Takes calcium 600 mg daily and vitamin D 2,000 Iu daily. No falls or fractures. \par \par In regards to hypothyroidism, takes LT4 75 mcg daily. Feeling good. Reports weight is stable.

## 2022-01-11 ENCOUNTER — NON-APPOINTMENT (OUTPATIENT)
Age: 72
End: 2022-01-11

## 2022-01-21 ENCOUNTER — RX RENEWAL (OUTPATIENT)
Age: 72
End: 2022-01-21

## 2022-03-02 ENCOUNTER — APPOINTMENT (OUTPATIENT)
Dept: GASTROENTEROLOGY | Facility: CLINIC | Age: 72
End: 2022-03-02
Payer: MEDICARE

## 2022-03-02 VITALS
BODY MASS INDEX: 22.02 KG/M2 | SYSTOLIC BLOOD PRESSURE: 120 MMHG | WEIGHT: 129 LBS | DIASTOLIC BLOOD PRESSURE: 70 MMHG | HEIGHT: 64 IN

## 2022-03-02 DIAGNOSIS — K63.5 POLYP OF COLON: ICD-10-CM

## 2022-03-02 DIAGNOSIS — K62.5 HEMORRHAGE OF ANUS AND RECTUM: ICD-10-CM

## 2022-03-02 DIAGNOSIS — Z86.39 PERSONAL HISTORY OF OTHER ENDOCRINE, NUTRITIONAL AND METABOLIC DISEASE: ICD-10-CM

## 2022-03-02 DIAGNOSIS — Z87.19 PERSONAL HISTORY OF OTHER DISEASES OF THE DIGESTIVE SYSTEM: ICD-10-CM

## 2022-03-02 PROCEDURE — 99204 OFFICE O/P NEW MOD 45 MIN: CPT

## 2022-03-02 RX ORDER — POLYETHYLENE GLYCOL-3350 AND ELECTROLYTES WITH FLAVOR PACK 240; 5.84; 2.98; 6.72; 22.72 G/278.26G; G/278.26G; G/278.26G; G/278.26G; G/278.26G
240 POWDER, FOR SOLUTION ORAL
Qty: 1 | Refills: 0 | Status: ACTIVE | COMMUNITY
Start: 2022-03-02 | End: 1900-01-01

## 2022-03-02 NOTE — PHYSICAL EXAM
[General Appearance - Alert] : alert [General Appearance - In No Acute Distress] : in no acute distress [Sclera] : the sclera and conjunctiva were normal [PERRL With Normal Accommodation] : pupils were equal in size, round, and reactive to light [Extraocular Movements] : extraocular movements were intact [Outer Ear] : the ears and nose were normal in appearance [Oropharynx] : the oropharynx was normal [Neck Appearance] : the appearance of the neck was normal [Neck Cervical Mass (___cm)] : no neck mass was observed [Jugular Venous Distention Increased] : there was no jugular-venous distention [Thyroid Diffuse Enlargement] : the thyroid was not enlarged [Thyroid Nodule] : there were no palpable thyroid nodules [] : no respiratory distress [Auscultation Breath Sounds / Voice Sounds] : lungs were clear to auscultation bilaterally [Heart Rate And Rhythm] : heart rate was normal and rhythm regular [Heart Sounds] : normal S1 and S2 [Heart Sounds Gallop] : no gallops [Murmurs] : no murmurs [Heart Sounds Pericardial Friction Rub] : no pericardial rub

## 2022-03-02 NOTE — CONSULT LETTER
[Dear  ___] : Dear  [unfilled], [Consult Letter:] : I had the pleasure of evaluating your patient, [unfilled]. [( Thank you for referring [unfilled] for consultation for _____ )] : Thank you for referring [unfilled] for consultation for [unfilled] [Please see my note below.] : Please see my note below. [Consult Closing:] : Thank you very much for allowing me to participate in the care of this patient.  If you have any questions, please do not hesitate to contact me. [Sincerely,] : Sincerely, [FreeTextEntry3] : Don\par \par Theron Potter MD\par \par Gastroenterology\par [facility]\par [facility]\par \par

## 2022-03-02 NOTE — ASSESSMENT
[FreeTextEntry1] : Rule out right-sided colon bleeding such as cecal angiodysplasias\par \par LINDA HERNANDEZ was advised to undergo colonoscopy to which she agreed. The procedure will be performed in [facility] with the assistance of an anesthesiologist. The patient was given a Suprep preparation prescription and understood the \par procedure as it was explained to her. She was given a booklet distributed by the American Society of Gastrointestinal\par  Endoscopy explaining the procedure in detail and she understood the risks of the procedure not limited to infection, bleeding,\par perforation or non- diagnosis of colorectal cancer. She was advised that she could not drive home, if she chooses to\par  receive sedation.\par \par Further diagnostic and treatment recommendations will be based upon the procedure and any biopsies, if they are taken.\par \par Thank you for allowing me to participate in this [facility] health care.\par \par , Best personal regards -- Don\par

## 2022-03-02 NOTE — HISTORY OF PRESENT ILLNESS
[FreeTextEntry1] : She is a 72-year-old female with a 2-week history of a change in bowel movements to maroon colored stools.  She denies abdominal pain, weight loss, constipation or diarrhea.  She denies NSAID use. Her lLast colonoscopy was February 2020 which was normal.  She also has a past history of colon polyps

## 2022-03-05 RX ORDER — SODIUM SULFATE, POTASSIUM SULFATE, MAGNESIUM SULFATE 17.5; 3.13; 1.6 G/ML; G/ML; G/ML
17.5-3.13-1.6 SOLUTION, CONCENTRATE ORAL TWICE DAILY
Qty: 2 | Refills: 0 | Status: ACTIVE | COMMUNITY
Start: 2022-03-05 | End: 1900-01-01

## 2022-03-07 ENCOUNTER — RX RENEWAL (OUTPATIENT)
Age: 72
End: 2022-03-07

## 2022-03-09 ENCOUNTER — APPOINTMENT (OUTPATIENT)
Dept: GASTROENTEROLOGY | Facility: AMBULATORY SURGERY CENTER | Age: 72
End: 2022-03-09
Payer: MEDICARE

## 2022-03-09 DIAGNOSIS — Z12.11 ENCOUNTER FOR SCREENING FOR MALIGNANT NEOPLASM OF COLON: ICD-10-CM

## 2022-03-09 PROCEDURE — 45378 DIAGNOSTIC COLONOSCOPY: CPT

## 2022-03-16 ENCOUNTER — APPOINTMENT (OUTPATIENT)
Dept: ENDOCRINOLOGY | Facility: CLINIC | Age: 72
End: 2022-03-16
Payer: MEDICARE

## 2022-03-16 VITALS
HEART RATE: 70 BPM | SYSTOLIC BLOOD PRESSURE: 110 MMHG | HEIGHT: 64 IN | DIASTOLIC BLOOD PRESSURE: 70 MMHG | OXYGEN SATURATION: 95 % | WEIGHT: 126 LBS | BODY MASS INDEX: 21.51 KG/M2 | TEMPERATURE: 98.2 F

## 2022-03-16 DIAGNOSIS — E10.65 TYPE 1 DIABETES MELLITUS WITH HYPERGLYCEMIA: ICD-10-CM

## 2022-03-16 LAB
GLUCOSE BLDC GLUCOMTR-MCNC: 131
HBA1C MFR BLD HPLC: 8.1

## 2022-03-16 PROCEDURE — 83036 HEMOGLOBIN GLYCOSYLATED A1C: CPT | Mod: QW

## 2022-03-16 PROCEDURE — 95251 CONT GLUC MNTR ANALYSIS I&R: CPT

## 2022-03-16 PROCEDURE — 99215 OFFICE O/P EST HI 40 MIN: CPT | Mod: 25

## 2022-03-16 RX ORDER — PEN NEEDLE, DIABETIC 32 GX 1/4"
32G X 6 MM NEEDLE, DISPOSABLE MISCELLANEOUS
Qty: 400 | Refills: 3 | Status: ACTIVE | COMMUNITY
Start: 2017-09-18 | End: 1900-01-01

## 2022-03-16 RX ORDER — FLASH GLUCOSE SENSOR
KIT MISCELLANEOUS
Qty: 6 | Refills: 3 | Status: DISCONTINUED | COMMUNITY
Start: 2019-02-13 | End: 2022-03-16

## 2022-03-16 RX ORDER — FLASH GLUCOSE SCANNING READER
EACH MISCELLANEOUS
Qty: 1 | Refills: 0 | Status: DISCONTINUED | COMMUNITY
Start: 2019-02-13 | End: 2022-03-16

## 2022-03-16 NOTE — PHYSICAL EXAM
[Alert] : alert [No Acute Distress] : no acute distress [Normal Sclera/Conjunctiva] : normal sclera/conjunctiva [EOMI] : extra ocular movement intact [No LAD] : no lymphadenopathy [Thyroid Not Enlarged] : the thyroid was not enlarged [No Thyroid Nodules] : no palpable thyroid nodules [No Respiratory Distress] : no respiratory distress [Clear to Auscultation] : lungs were clear to auscultation bilaterally [Normal S1, S2] : normal S1 and S2 [Regular Rhythm] : with a regular rhythm [No Edema] : no peripheral edema [Pedal Pulses Normal] : the pedal pulses are present [Normal Bowel Sounds] : normal bowel sounds [Not Tender] : non-tender [Not Distended] : not distended [Soft] : abdomen soft [Normal Anterior Cervical Nodes] : no anterior cervical lymphadenopathy [No Spinal Tenderness] : no spinal tenderness [Kyphosis] : kyphosis present [No Clubbing, Cyanosis] : no clubbing  or cyanosis of the fingernails [No Rash] : no rash [Normal] : normal [2+] : 2+ in the dorsalis pedis [Diminished Throughout Both Feet] : diminished tactile sensation with monofilament testing throughout both feet [Normal Reflexes] : deep tendon reflexes were 2+ and symmetric [Normal Affect] : the affect was normal [Normal Mood] : the mood was normal [Right foot was examined, including] : right foot ~C was examined, including visual inspection with sensory and pulse exams [Left foot was examined, including] : left foot ~C was examined, including visual inspection with sensory and pulse exams [de-identified] : 2/6 DANIEL [FreeTextEntry1] : bunion and callus [FreeTextEntry5] : bunion

## 2022-03-16 NOTE — HISTORY OF PRESENT ILLNESS
[FreeTextEntry1] : 72 y.o. female with h/o Type 1 1/2 DM diagnosed in 2009, osteoporosis, and hypothyroidism here for follow up visit. Had Moderna COVID-19 vaccine X  2 plus booster. 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  Using Dexcom now and happy with it. On basal bolus regimen. Less hypoglycemia. Taking Tresiba 22 (23 sometimes) units daily. Takes Humalog 7 to 8 units before breakfast, 8 units before lunch and 8 to 9 (10 units for pizza) units before dinner. Does have evening snack which includes 1/2 apple with no protein and typically does not cover with insulin. If FS is above 250 at bedtime, takes Humalog 1 unit. No complications. UTD with opthalmology and no retinopathy. No foot complaints. No proteinuria. UTD with cardiology and had normal stress test. Reports weight is stable now. Had full GI work up for weight loss which was normal. No abdominal pain and no change in bowels. Had colonoscopy on 3/9/22 which showed internal hemorrhoids only with normal colon. \par \par In regards to osteoporosis,  DEXA scan in July 2017 left femoral neck -2.2, total hip -2.0 and 1/3 radius -3.6 and spine -1.3 is falsely elevated. DEXA scan performed in July 2018 shows spine -1.1 which is stable with arthritis, left femoral neck -2.1 which is stable and total hip -2.0 which is stable and 1/3 radius -3.6 which is stable.  DEXA scan performed in July 2020 shows spine -0.9 which is stable with arthritis, left femoral neck -1.9 which is stable and total hip -2.0 which is stable and 1/3 radius -3.5 which is stable. \par \par Taking Alendronate 70 mg weekly since July 2017. Follows with dentist and had implants in October 2019. Takes calcium 600 mg daily and vitamin D 2,000 Iu daily. No falls or fractures. \par \par In regards to hypothyroidism, takes LT4 75 mcg daily. Feeling good. Reports weight is stable.  [Continuous Glucose Monitoring] : Continuous Glucose Monitoring: Yes [Dexcom] : Dexcom [FreeTextEntry2] : 33 [FreeTextEntry3] : 66 [FreeTextEntry4] : 1 [de-identified] : 8.3

## 2022-03-16 NOTE — ASSESSMENT
[Carbohydrate Consistent Diet] : carbohydrate consistent diet [Hypoglycemia Management] : hypoglycemia management [Diabetes Foot Care] : diabetes foot care [Bisphosphonate Therapy] : Risks  and benefits of bisphosphonate therapy were  discussed with the patient including gastroesophageal irritation, osteonecrosis of the jaw, and atypical femur fractures, and acute phase reaction [FreeTextEntry1] : 72 y.o. female with h/o Type 1 DM, HTN, hyperlipidemia, hypothyroidism and osteoporosis. \par \par 1. Type 1 DM- Suboptimal control with Hba1c of 8.1% today. Encouraged a carbohydrate consistent diet. Dexcom CGMS download reviewed. Will increase Tresiba to 23 units daily and will increase Humalog to 8 units before breakfast, will continue  8 units before lunch and will increase to 10 units before dinner. Also encouraged treating bedtime snack with Humalog 2 units if carb amount is greater than 15 grams. Will add 1/50 correction if blood glucose is > 200. Encouraged patient to keep in touch with blood glucose readings. Will continue with Dexcom G6 for better accuracy and safety given patient now has less hypoglycemia. Stable CMP and urine alb/cr ratio in 9/2021 and will repeat today. \par \par 2. HTN- BP is at goal and will continue medications\par \par 3. Hyperlipidemia- Will check lipids and will continue statin\par \par 4. Hypothyroidism/Hashimoto's disease- Patient appears euthyroid. Will continue LT4 75 mcg daily for now and will check TFTs. \par \par 5. Osteoporosis- DEXA scan performed in July 2020 shows spine -0.9 which is stable with arthritis, left femoral neck -1.9 which is stable and total hip -2.0 which is stable and 1/3 radius -3.5 which is stable. Given increased risk of fracture, will continue Alendronate 70 mg po weekly. Encouraged weight bearing activity. Discussed appropriate calcium and vitamin D intake. Will continue vitamin D3 2,000 IU daily.  Will repeat DEXA scan in 7/2022 and will plan for drug holiday.\par \par Follow up in 3 months

## 2022-03-17 LAB
25(OH)D3 SERPL-MCNC: 36.6 NG/ML
ALBUMIN SERPL ELPH-MCNC: 4.6 G/DL
ALP BLD-CCNC: 70 U/L
ALT SERPL-CCNC: 13 U/L
ANION GAP SERPL CALC-SCNC: 11 MMOL/L
AST SERPL-CCNC: 22 U/L
BASOPHILS # BLD AUTO: 0.05 K/UL
BASOPHILS NFR BLD AUTO: 0.7 %
BILIRUB SERPL-MCNC: 0.4 MG/DL
BUN SERPL-MCNC: 15 MG/DL
CALCIUM SERPL-MCNC: 10.2 MG/DL
CHLORIDE SERPL-SCNC: 101 MMOL/L
CHOLEST SERPL-MCNC: 148 MG/DL
CO2 SERPL-SCNC: 28 MMOL/L
CREAT SERPL-MCNC: 0.64 MG/DL
CREAT SPEC-SCNC: 57 MG/DL
EGFR: 93 ML/MIN/1.73M2
EOSINOPHIL # BLD AUTO: 0.18 K/UL
EOSINOPHIL NFR BLD AUTO: 2.4 %
GLUCOSE SERPL-MCNC: 128 MG/DL
HCT VFR BLD CALC: 44.9 %
HDLC SERPL-MCNC: 81 MG/DL
HGB BLD-MCNC: 15 G/DL
IMM GRANULOCYTES NFR BLD AUTO: 0.5 %
LDLC SERPL CALC-MCNC: 56 MG/DL
LYMPHOCYTES # BLD AUTO: 2.87 K/UL
LYMPHOCYTES NFR BLD AUTO: 39 %
MAN DIFF?: NORMAL
MCHC RBC-ENTMCNC: 32.6 PG
MCHC RBC-ENTMCNC: 33.4 GM/DL
MCV RBC AUTO: 97.6 FL
MICROALBUMIN 24H UR DL<=1MG/L-MCNC: <1.2 MG/DL
MICROALBUMIN/CREAT 24H UR-RTO: NORMAL MG/G
MONOCYTES # BLD AUTO: 0.62 K/UL
MONOCYTES NFR BLD AUTO: 8.4 %
NEUTROPHILS # BLD AUTO: 3.6 K/UL
NEUTROPHILS NFR BLD AUTO: 49 %
NONHDLC SERPL-MCNC: 67 MG/DL
PLATELET # BLD AUTO: 210 K/UL
POTASSIUM SERPL-SCNC: 4.6 MMOL/L
PROT SERPL-MCNC: 7.2 G/DL
RBC # BLD: 4.6 M/UL
RBC # FLD: 12.8 %
SODIUM SERPL-SCNC: 139 MMOL/L
T4 FREE SERPL-MCNC: 1.5 NG/DL
TRIGL SERPL-MCNC: 52 MG/DL
TSH SERPL-ACNC: 1.85 UIU/ML
WBC # FLD AUTO: 7.36 K/UL

## 2022-03-22 ENCOUNTER — APPOINTMENT (OUTPATIENT)
Dept: GASTROENTEROLOGY | Facility: CLINIC | Age: 72
End: 2022-03-22
Payer: MEDICARE

## 2022-03-22 VITALS — HEIGHT: 64 IN | WEIGHT: 127 LBS | BODY MASS INDEX: 21.68 KG/M2

## 2022-03-22 DIAGNOSIS — R13.19 OTHER DYSPHAGIA: ICD-10-CM

## 2022-03-22 PROCEDURE — 99214 OFFICE O/P EST MOD 30 MIN: CPT

## 2022-03-22 NOTE — PHYSICAL EXAM
[General Appearance - Alert] : alert [General Appearance - In No Acute Distress] : in no acute distress [Sclera] : the sclera and conjunctiva were normal [PERRL With Normal Accommodation] : pupils were equal in size, round, and reactive to light [Extraocular Movements] : extraocular movements were intact [Outer Ear] : the ears and nose were normal in appearance [Oropharynx] : the oropharynx was normal [Neck Appearance] : the appearance of the neck was normal [Neck Cervical Mass (___cm)] : no neck mass was observed [Jugular Venous Distention Increased] : there was no jugular-venous distention [Thyroid Diffuse Enlargement] : the thyroid was not enlarged [Thyroid Nodule] : there were no palpable thyroid nodules [Auscultation Breath Sounds / Voice Sounds] : lungs were clear to auscultation bilaterally [Heart Sounds] : normal S1 and S2 [Heart Rate And Rhythm] : heart rate was normal and rhythm regular [Heart Sounds Gallop] : no gallops [Murmurs] : no murmurs [Heart Sounds Pericardial Friction Rub] : no pericardial rub [Bowel Sounds] : normal bowel sounds [Abdomen Soft] : soft [Abdomen Tenderness] : non-tender [] : no hepato-splenomegaly [Abdomen Mass (___ Cm)] : no abdominal mass palpated

## 2022-03-22 NOTE — ASSESSMENT
[FreeTextEntry1] : LINDA HERNANDEZ was advised to undergo endoscopy to which she agreed. The procedure will be performed in [facility] with the assistance of an anesthesiologist. She was given a booklet distributed by the American Society of Gastrointestinal Endoscopy explaining the procedure in detail and she understood the risks of the procedure not limited to infection, bleeding, perforation or non- diagnosis of gastric or esophageal cancer.  She was advised that she could not drive home, if she chooses to receive sedation. Further diagnostic and treatment recommendations will be based upon the procedure and any biopsies, if they are taken. Thank you for allowing me to participate in this [facility] health care.\par

## 2022-03-22 NOTE — HISTORY OF PRESENT ILLNESS
[FreeTextEntry1] : She had a recent colonoscopy for black stools  which was completely normal.  Cecal angiodysplasias were not  seen and her terminal ileum was normal.  This has since stopped.  She has had no recurrence of her black stools.  She does complain now of recent onset of food getting stuck in the middle of her esophagus which requires water to get down. She denies heartburn, abdominal pain or NSAID use.

## 2022-03-22 NOTE — CONSULT LETTER
[Dear  ___] : Dear  [unfilled], [Consult Letter:] : I had the pleasure of evaluating your patient, [unfilled]. [( Thank you for referring [unfilled] for consultation for _____ )] : Thank you for referring [unfilled] for consultation for [unfilled] [Please see my note below.] : Please see my note below. [Consult Closing:] : Thank you very much for allowing me to participate in the care of this patient.  If you have any questions, please do not hesitate to contact me. [Sincerely,] : Sincerely, [FreeTextEntry3] : Don\par \par Theron Potter MD\par \par Gastroenterology\par [facility]\par [facility]\par \par

## 2022-03-29 ENCOUNTER — APPOINTMENT (OUTPATIENT)
Dept: GASTROENTEROLOGY | Facility: AMBULATORY SURGERY CENTER | Age: 72
End: 2022-03-29
Payer: MEDICARE

## 2022-03-29 ENCOUNTER — RESULT REVIEW (OUTPATIENT)
Age: 72
End: 2022-03-29

## 2022-03-29 DIAGNOSIS — K21.9 GASTRO-ESOPHAGEAL REFLUX DISEASE W/OUT ESOPHAGITIS: ICD-10-CM

## 2022-03-29 PROCEDURE — 43239 EGD BIOPSY SINGLE/MULTIPLE: CPT

## 2022-03-29 RX ORDER — PANTOPRAZOLE 40 MG/1
40 TABLET, DELAYED RELEASE ORAL
Qty: 30 | Refills: 5 | Status: ACTIVE | COMMUNITY
Start: 2022-03-29 | End: 1900-01-01

## 2022-04-05 ENCOUNTER — APPOINTMENT (OUTPATIENT)
Dept: GASTROENTEROLOGY | Facility: CLINIC | Age: 72
End: 2022-04-05

## 2022-04-06 ENCOUNTER — APPOINTMENT (OUTPATIENT)
Dept: OTOLARYNGOLOGY | Facility: CLINIC | Age: 72
End: 2022-04-06

## 2022-04-14 ENCOUNTER — APPOINTMENT (OUTPATIENT)
Dept: OTOLARYNGOLOGY | Facility: CLINIC | Age: 72
End: 2022-04-14
Payer: MEDICARE

## 2022-04-14 VITALS
DIASTOLIC BLOOD PRESSURE: 67 MMHG | SYSTOLIC BLOOD PRESSURE: 123 MMHG | HEART RATE: 66 BPM | BODY MASS INDEX: 21.68 KG/M2 | TEMPERATURE: 98 F | HEIGHT: 64 IN | WEIGHT: 127 LBS

## 2022-04-14 DIAGNOSIS — J38.2 NODULES OF VOCAL CORDS: ICD-10-CM

## 2022-04-14 DIAGNOSIS — R49.0 DYSPHONIA: ICD-10-CM

## 2022-04-14 DIAGNOSIS — R13.19 OTHER DYSPHAGIA: ICD-10-CM

## 2022-04-14 PROCEDURE — 99214 OFFICE O/P EST MOD 30 MIN: CPT | Mod: 25

## 2022-04-14 PROCEDURE — 31575 DIAGNOSTIC LARYNGOSCOPY: CPT

## 2022-04-14 RX ORDER — FLUTICASONE PROPIONATE 50 UG/1
50 SPRAY, METERED NASAL DAILY
Qty: 1 | Refills: 3 | Status: ACTIVE | COMMUNITY
Start: 2022-04-14 | End: 1900-01-01

## 2022-04-14 NOTE — HISTORY OF PRESENT ILLNESS
[de-identified] : Patient has a history of vocal cord polyp, previously treated with steroids which did shrink the polyp but she was told to return for check ups. She does not have any pain in the throat, but does have occasional sensation of choking when eating and swallowing. She had an endoscopy and is being followed by her GI for these symptoms, and was diagnosed with an ulcer. She still feels like food goes down now but it sits in stomach for a long time. Even when she drinks water she has to take small sips or she feels like she is swallowing air and has to burp. She occasionally has some changes in her voice with hoarseness but this is mild and temporary

## 2022-04-14 NOTE — ASSESSMENT
[FreeTextEntry1] : Patient follows up has had a nodule in 2019 was treated with steroids and never made it back fiberoptic evaluation showed actually the nodule responded to the medications and has not recurred everything looks normal she is complaining of some difficulty swallowing certain types of food she does have a she did have an endoscopy that showed that she has a bit of a stomach ulcer decided to send her for a modified barium swallow to further evaluate her swallowing mechanism and see if there is any ED any issues that need to be looked into further.

## 2022-04-14 NOTE — END OF VISIT
[FreeTextEntry3] : I saw and examined this patient in person. I have discussed with Yadira Tomas, Physician Assistant, in detail the above note and agree with the above assessment and plan of care.\par

## 2022-05-05 ENCOUNTER — APPOINTMENT (OUTPATIENT)
Dept: RADIOLOGY | Facility: HOSPITAL | Age: 72
End: 2022-05-05
Payer: MEDICARE

## 2022-05-05 ENCOUNTER — OUTPATIENT (OUTPATIENT)
Dept: OUTPATIENT SERVICES | Facility: HOSPITAL | Age: 72
LOS: 1 days | Discharge: HOME | End: 2022-05-05

## 2022-05-05 ENCOUNTER — OUTPATIENT (OUTPATIENT)
Dept: OUTPATIENT SERVICES | Facility: HOSPITAL | Age: 72
LOS: 1 days | End: 2022-05-05

## 2022-05-05 ENCOUNTER — APPOINTMENT (OUTPATIENT)
Dept: SPEECH THERAPY | Facility: HOSPITAL | Age: 72
End: 2022-05-05
Payer: MEDICARE

## 2022-05-05 DIAGNOSIS — R13.19 OTHER DYSPHAGIA: ICD-10-CM

## 2022-05-05 PROCEDURE — 74230 X-RAY XM SWLNG FUNCJ C+: CPT | Mod: 26

## 2022-05-06 ENCOUNTER — NON-APPOINTMENT (OUTPATIENT)
Age: 72
End: 2022-05-06

## 2022-05-06 DIAGNOSIS — R13.12 DYSPHAGIA, OROPHARYNGEAL PHASE: ICD-10-CM

## 2022-05-17 ENCOUNTER — NON-APPOINTMENT (OUTPATIENT)
Age: 72
End: 2022-05-17

## 2022-05-30 ENCOUNTER — RX RENEWAL (OUTPATIENT)
Age: 72
End: 2022-05-30

## 2022-06-03 ENCOUNTER — RX RENEWAL (OUTPATIENT)
Age: 72
End: 2022-06-03

## 2022-06-08 ENCOUNTER — APPOINTMENT (OUTPATIENT)
Dept: GASTROENTEROLOGY | Facility: CLINIC | Age: 72
End: 2022-06-08
Payer: MEDICARE

## 2022-06-08 VITALS
RESPIRATION RATE: 15 BRPM | HEART RATE: 66 BPM | WEIGHT: 127 LBS | HEIGHT: 64 IN | DIASTOLIC BLOOD PRESSURE: 80 MMHG | TEMPERATURE: 98 F | BODY MASS INDEX: 21.68 KG/M2 | SYSTOLIC BLOOD PRESSURE: 126 MMHG

## 2022-06-08 DIAGNOSIS — K25.9 GASTRIC ULCER, UNSPECIFIED AS ACUTE OR CHRONIC, W/OUT HEMORRHAGE OR PERFORATION: ICD-10-CM

## 2022-06-08 PROCEDURE — 99214 OFFICE O/P EST MOD 30 MIN: CPT

## 2022-06-08 NOTE — ASSESSMENT
[FreeTextEntry1] : Continue pantoprazole\par \par Repeat endoscopy in 3 months to confirm complete healing of her ulcer\par \par LINDA HERNANDEZ was advised to undergo endoscopy to which she agreed. The procedure will be performed in [facility] with the assistance of an anesthesiologist. She was given a booklet distributed by the American Society of Gastrointestinal Endoscopy explaining the procedure in detail and she understood the risks of the procedure not limited to infection, bleeding, perforation or non- diagnosis of gastric or esophageal cancer.  She was advised that she could not drive home, if she chooses to receive sedation. Further diagnostic and treatment recommendations will be based upon the procedure and any biopsies, if they are taken. Thank you for allowing me to participate in this [facility] care.\par  DISPLAY PLAN FREE TEXT DISPLAY PLAN FREE TEXT DISPLAY PLAN FREE TEXT DISPLAY PLAN FREE TEXT DISPLAY PLAN FREE TEXT DISPLAY PLAN FREE TEXT DISPLAY PLAN FREE TEXT DISPLAY PLAN FREE TEXT DISPLAY PLAN FREE TEXT DISPLAY PLAN FREE TEXT DISPLAY PLAN FREE TEXT DISPLAY PLAN FREE TEXT DISPLAY PLAN FREE TEXT DISPLAY PLAN FREE TEXT

## 2022-06-08 NOTE — CONSULT LETTER
[Dear  ___] : Dear  [unfilled], [Consult Letter:] : I had the pleasure of evaluating your patient, [unfilled]. [( Thank you for referring [unfilled] for consultation for _____ )] : Thank you for referring [unfilled] for consultation for [unfilled] [Please see my note below.] : Please see my note below. [Consult Closing:] : Thank you very much for allowing me to participate in the care of this patient.  If you have any questions, please do not hesitate to contact me. [Sincerely,] : Sincerely, [FreeTextEntry3] : Don\par \par Theron Potter MD\par \par Gastroenterology\par [facility]\par [facility]\par \par

## 2022-06-08 NOTE — HISTORY OF PRESENT ILLNESS
[FreeTextEntry1] : Endoscopy revealed normal esophagus, a small hiatal hernia and a small gastric ulcer. Bx's were neg for Helicobacter pylori.  She is on pantoprazole with resolution of her symptoms.  She recently had a modified barium swallow due to some difficulty swallowing from her pharynx to her esophagus which was normal.  She is feeling well and has no complaints

## 2022-07-15 ENCOUNTER — APPOINTMENT (OUTPATIENT)
Dept: ENDOCRINOLOGY | Facility: CLINIC | Age: 72
End: 2022-07-15

## 2022-07-15 VITALS — BODY MASS INDEX: 23.79 KG/M2 | HEIGHT: 61 IN | TEMPERATURE: 98 F | WEIGHT: 126 LBS

## 2022-07-15 VITALS — OXYGEN SATURATION: 96 % | DIASTOLIC BLOOD PRESSURE: 70 MMHG | SYSTOLIC BLOOD PRESSURE: 110 MMHG | HEART RATE: 62 BPM

## 2022-07-15 LAB
GLUCOSE BLDC GLUCOMTR-MCNC: 179
HBA1C MFR BLD HPLC: 7.8

## 2022-07-15 PROCEDURE — 95251 CONT GLUC MNTR ANALYSIS I&R: CPT

## 2022-07-15 PROCEDURE — 77080 DXA BONE DENSITY AXIAL: CPT

## 2022-07-15 PROCEDURE — 99215 OFFICE O/P EST HI 40 MIN: CPT | Mod: 25

## 2022-07-15 PROCEDURE — ZZZZZ: CPT

## 2022-07-15 PROCEDURE — 83036 HEMOGLOBIN GLYCOSYLATED A1C: CPT | Mod: QW

## 2022-07-15 RX ORDER — NIRMATRELVIR AND RITONAVIR 300-100 MG
20 X 150 MG & KIT ORAL
Qty: 30 | Refills: 0 | Status: DISCONTINUED | COMMUNITY
Start: 2022-01-22

## 2022-07-15 RX ORDER — ALENDRONATE SODIUM 70 MG/1
70 TABLET ORAL
Qty: 12 | Refills: 3 | Status: DISCONTINUED | COMMUNITY
Start: 2017-07-03 | End: 2022-07-15

## 2022-07-15 NOTE — HISTORY OF PRESENT ILLNESS
[Continuous Glucose Monitoring] : Continuous Glucose Monitoring: Yes [Dexcom] : Dexcom [FreeTextEntry1] : 72 y.o. female with h/o Type 1 1/2 DM diagnosed in 2009, osteoporosis, and hypothyroidism here for follow up visit. Had Moderna COVID-19 vaccine X  2 plus booster X 2. 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  Using Dexcom now and happy with it. On basal bolus regimen. Less hypoglycemia. Taking Tresiba 23 units daily. Takes Humalog 6 to 7 units before breakfast, 7 units before lunch and 8 to 9 units before dinner. Does have evening snack which includes 1/2 apple with no protein and typically does not cover with insulin. If FS is above 250 at bedtime, takes Humalog 1 unit. No complications. UTD with opthalmology and no retinopathy. No foot complaints. No proteinuria. UTD with cardiology and had normal stress test. Reports weight is stable now. Had full GI work up for weight loss which was normal. No abdominal pain and no change in bowels. Had colonoscopy on 3/9/22 which showed internal hemorrhoids only with normal colon. \par \par In regards to osteoporosis,  DEXA scan in July 2017 left femoral neck -2.2, total hip -2.0 and 1/3 radius -3.6 and spine -1.3 is falsely elevated. DEXA scan performed in July 2018 shows spine -1.1 which is stable with arthritis, left femoral neck -2.1 which is stable and total hip -2.0 which is stable and 1/3 radius -3.6 which is stable.  DEXA scan performed in July 2020 shows spine -0.9 which is stable with arthritis, left femoral neck -1.9 which is stable and total hip -2.0 which is stable and 1/3 radius -3.5 which is stable. \par \par Taking Alendronate 70 mg weekly since July 2017. Follows with dentist and had implants in October 2019. Takes calcium 600 mg daily and vitamin D 2,000 Iu daily. No falls or fractures. \par \par In regards to hypothyroidism, takes LT4 75 mcg daily. Feeling good. Reports weight is stable. Does reports stress and anxiety.  [FreeTextEntry2] : 43 [FreeTextEntry3] : 56 [FreeTextEntry4] : 1 [de-identified] : 8.0%

## 2022-07-15 NOTE — ASSESSMENT
[Carbohydrate Consistent Diet] : carbohydrate consistent diet [Hypoglycemia Management] : hypoglycemia management [Diabetes Foot Care] : diabetes foot care [FreeTextEntry1] : 72 y.o. 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 \par \par 1. Type 1 1/2 DM- Fair control with Hba1c of 7.8% today. Encouraged a carbohydrate consistent diet. Dexcom CGMS download reviewed. Will continue Tresiba 23 units daily and will continue Humalog  6 to 7 units before breakfast, will continue 7 units before lunch and will increase to 9 to 10 units before dinner. Also encouraged treating bedtime snack with Humalog 2 units if carb amount is greater than 15 grams. Will add 1/50 bedtime correction if blood glucose is > 250. Encouraged patient to keep in touch with blood glucose readings. Will continue with Dexcom G6 for better accuracy and safety given patient now has less hypoglycemia. Stable CMP and urine alb/cr ratio in March 2022. \par \par 2. HTN- BP is at goal and will continue medications\par \par 3. Hyperlipidemia- Lipids are at goal and will continue statin\par \par 4. Hypothyroidism/Hashimoto's disease- Patient is euthyroid. Will continue LT4 75 mcg daily for now. \par \par 5. Osteoporosis- DEXA scan performed today shows left femoral neck -2.3 which is a 7.4% decrease and total hip -2.3 which is 4.2% decrease and 1/3 radius -3.5 which is stable. Given increased risk of fracture, will discontinue Alendronate 70 mg po weekly and start Denosumab. Reviewed risks and benefits of Denosumab including avoiding abrupt discontinuation causing bone loss and vertebral fractures. Encouraged weight bearing activity. Discussed appropriate calcium and vitamin D intake. Will continue vitamin D3 2,000 IU daily.  Will start Denosumab in 3 months at her next office visit and then would repeat DEXA scan 1 year after starting Denosumab. \par \par Follow up in 3 months [Denosumab Therapy] : Risks  and benefits of denosumab therapy were discussed with the patient including eczema, cellulitis, osteonecrosis of the jaw and atypical femur fractures

## 2022-07-15 NOTE — PHYSICAL EXAM
[Alert] : alert [No Acute Distress] : no acute distress [Normal Sclera/Conjunctiva] : normal sclera/conjunctiva [EOMI] : extra ocular movement intact [No LAD] : no lymphadenopathy [Thyroid Not Enlarged] : the thyroid was not enlarged [No Thyroid Nodules] : no palpable thyroid nodules [No Respiratory Distress] : no respiratory distress [Clear to Auscultation] : lungs were clear to auscultation bilaterally [Normal S1, S2] : normal S1 and S2 [Regular Rhythm] : with a regular rhythm [No Edema] : no peripheral edema [Pedal Pulses Normal] : the pedal pulses are present [Normal Bowel Sounds] : normal bowel sounds [Not Tender] : non-tender [Not Distended] : not distended [Soft] : abdomen soft [Normal Anterior Cervical Nodes] : no anterior cervical lymphadenopathy [No Spinal Tenderness] : no spinal tenderness [Kyphosis] : kyphosis present [No Clubbing, Cyanosis] : no clubbing  or cyanosis of the fingernails [No Rash] : no rash [Right foot was examined, including] : right foot ~C was examined, including visual inspection with sensory and pulse exams [Left foot was examined, including] : left foot ~C was examined, including visual inspection with sensory and pulse exams [Normal] : normal [2+] : 2+ in the dorsalis pedis [Diminished Throughout Both Feet] : diminished tactile sensation with monofilament testing throughout both feet [Normal Reflexes] : deep tendon reflexes were 2+ and symmetric [Normal Affect] : the affect was normal [Normal Mood] : the mood was normal [de-identified] : 2/6 DANIEL [FreeTextEntry1] : bunion and callus [FreeTextEntry5] : bunion

## 2022-09-15 ENCOUNTER — APPOINTMENT (OUTPATIENT)
Dept: GASTROENTEROLOGY | Facility: AMBULATORY SURGERY CENTER | Age: 72
End: 2022-09-15

## 2022-09-15 PROCEDURE — 43235 EGD DIAGNOSTIC BRUSH WASH: CPT

## 2022-10-14 ENCOUNTER — APPOINTMENT (OUTPATIENT)
Dept: MAMMOGRAPHY | Facility: IMAGING CENTER | Age: 72
End: 2022-10-14

## 2022-10-14 ENCOUNTER — RESULT REVIEW (OUTPATIENT)
Age: 72
End: 2022-10-14

## 2022-10-14 ENCOUNTER — APPOINTMENT (OUTPATIENT)
Dept: ULTRASOUND IMAGING | Facility: IMAGING CENTER | Age: 72
End: 2022-10-14

## 2022-10-14 ENCOUNTER — OUTPATIENT (OUTPATIENT)
Dept: OUTPATIENT SERVICES | Facility: HOSPITAL | Age: 72
LOS: 1 days | End: 2022-10-14
Payer: MEDICARE

## 2022-10-14 DIAGNOSIS — Z00.8 ENCOUNTER FOR OTHER GENERAL EXAMINATION: ICD-10-CM

## 2022-10-14 PROCEDURE — 76641 ULTRASOUND BREAST COMPLETE: CPT | Mod: 26,50

## 2022-10-14 PROCEDURE — 77067 SCR MAMMO BI INCL CAD: CPT | Mod: 26

## 2022-10-14 PROCEDURE — 77063 BREAST TOMOSYNTHESIS BI: CPT | Mod: 26

## 2022-10-14 PROCEDURE — 77067 SCR MAMMO BI INCL CAD: CPT

## 2022-10-14 PROCEDURE — 76641 ULTRASOUND BREAST COMPLETE: CPT

## 2022-10-14 PROCEDURE — 77063 BREAST TOMOSYNTHESIS BI: CPT

## 2022-10-21 ENCOUNTER — APPOINTMENT (OUTPATIENT)
Dept: ENDOCRINOLOGY | Facility: CLINIC | Age: 72
End: 2022-10-21

## 2022-10-21 VITALS
HEART RATE: 79 BPM | BODY MASS INDEX: 23.03 KG/M2 | SYSTOLIC BLOOD PRESSURE: 130 MMHG | TEMPERATURE: 98.1 F | OXYGEN SATURATION: 97 % | DIASTOLIC BLOOD PRESSURE: 70 MMHG | HEIGHT: 61 IN | WEIGHT: 122 LBS

## 2022-10-21 DIAGNOSIS — Z23 ENCOUNTER FOR IMMUNIZATION: ICD-10-CM

## 2022-10-21 LAB
GLUCOSE BLDC GLUCOMTR-MCNC: 217
HBA1C MFR BLD HPLC: 7.8

## 2022-10-21 PROCEDURE — 83036 HEMOGLOBIN GLYCOSYLATED A1C: CPT | Mod: QW

## 2022-10-21 PROCEDURE — 95251 CONT GLUC MNTR ANALYSIS I&R: CPT

## 2022-10-21 PROCEDURE — 99215 OFFICE O/P EST HI 40 MIN: CPT | Mod: 25

## 2022-10-21 PROCEDURE — 96372 THER/PROPH/DIAG INJ SC/IM: CPT

## 2022-10-21 PROCEDURE — 90662 IIV NO PRSV INCREASED AG IM: CPT

## 2022-10-21 PROCEDURE — G0008: CPT

## 2022-10-21 RX ORDER — DENOSUMAB 60 MG/ML
60 INJECTION SUBCUTANEOUS
Qty: 1 | Refills: 0 | Status: COMPLETED | OUTPATIENT
Start: 2022-10-21

## 2022-10-21 RX ADMIN — DENOSUMAB 0 MG/ML: 60 INJECTION SUBCUTANEOUS at 00:00

## 2022-10-22 NOTE — HISTORY OF PRESENT ILLNESS
[Continuous Glucose Monitoring] : Continuous Glucose Monitoring: Yes [Dexcom] : Dexcom [FreeTextEntry1] : 72 y.o. female with h/o Type 1 1/2 DM diagnosed in 2009, osteoporosis, and hypothyroidism here for follow up visit. Had Moderna COVID-19 vaccine X  2 plus booster X 2. 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  Using Dexcom now and happy with it. On basal bolus regimen. Less hypoglycemia. Taking Tresiba 22 units daily. Takes Humalog 6 units before breakfast, 7 units before lunch and 8 units before dinner. Does have evening snack which includes 1/2 apple with no protein and typically does not cover with insulin. If FS is above 250 at bedtime, takes Humalog 1 unit. No complications. UTD with opthalmology and no retinopathy. No foot complaints. No proteinuria. UTD with cardiology (Dr. Loaiza at [facility]) and had normal stress test. Reports weight is stable now. Had full GI work up for weight loss which was normal. No abdominal pain and no change in bowels. Had colonoscopy on 3/9/22 which showed internal hemorrhoids only with normal colon. \par \par In regards to osteoporosis, no falls or fractures. \par \par DEXA scan in July 2017 left femoral neck -2.2, total hip -2.0 and 1/3 radius -3.6 and spine -1.3 is falsely elevated. \par DEXA scan in July 2018 shows spine -1.1 which is stable with arthritis, left femoral neck -2.1 which is stable and total hip -2.0 which is stable and 1/3 radius -3.6 which is stable.  \par DEXA scan in July 2020 shows spine -0.9 which is stable with arthritis, left femoral neck -1.9 which is stable and total hip -2.0 which is stable and 1/3 radius -3.5 which is stable. \par DEXA scan in May 2022 shows left femoral neck -2.3 which is a 7.4% decrease and total hip -2.3 which is 4.2% decrease and 1/3 radius -3.5 which is stable. \par \par She was taking Alendronate 70 mg weekly from July 2017 until July 2022. Follows with dentist and had implants in October 2019. Takes calcium 600 mg daily and vitamin D 2,000 Iu daily.\par \par In regards to hypothyroidism, takes LT4 75 mcg daily. Feeling good. Reports weight is stable. Does reports stress and anxiety.  [FreeTextEntry2] : 52 [FreeTextEntry3] : 46 [FreeTextEntry4] : 2

## 2022-10-22 NOTE — PHYSICAL EXAM
[Alert] : alert [No Acute Distress] : no acute distress [Normal Sclera/Conjunctiva] : normal sclera/conjunctiva [EOMI] : extra ocular movement intact [No LAD] : no lymphadenopathy [Thyroid Not Enlarged] : the thyroid was not enlarged [No Thyroid Nodules] : no palpable thyroid nodules [No Respiratory Distress] : no respiratory distress [Clear to Auscultation] : lungs were clear to auscultation bilaterally [Normal S1, S2] : normal S1 and S2 [Regular Rhythm] : with a regular rhythm [No Edema] : no peripheral edema [Pedal Pulses Normal] : the pedal pulses are present [Normal Bowel Sounds] : normal bowel sounds [Not Tender] : non-tender [Not Distended] : not distended [Soft] : abdomen soft [Normal Anterior Cervical Nodes] : no anterior cervical lymphadenopathy [No Spinal Tenderness] : no spinal tenderness [Kyphosis] : kyphosis present [No Clubbing, Cyanosis] : no clubbing  or cyanosis of the fingernails [No Rash] : no rash [Right foot was examined, including] : right foot ~C was examined, including visual inspection with sensory and pulse exams [Left foot was examined, including] : left foot ~C was examined, including visual inspection with sensory and pulse exams [Normal] : normal [2+] : 2+ in the dorsalis pedis [Diminished Throughout Both Feet] : diminished tactile sensation with monofilament testing throughout both feet [Normal Reflexes] : deep tendon reflexes were 2+ and symmetric [Normal Affect] : the affect was normal [Normal Mood] : the mood was normal [de-identified] : 2/6 DANIEL [FreeTextEntry1] : bunion and callus [FreeTextEntry5] : bunion

## 2022-10-22 NOTE — ASSESSMENT
[Carbohydrate Consistent Diet] : carbohydrate consistent diet [Hypoglycemia Management] : hypoglycemia management [Diabetes Foot Care] : diabetes foot care [Denosumab Therapy] : Risks  and benefits of denosumab therapy were discussed with the patient including eczema, cellulitis, osteonecrosis of the jaw and atypical femur fractures [FreeTextEntry1] : 72 y.o. 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 \par \par 1. Type 1 1/2 DM- Fair control with Hba1c of 7.8% today. Encouraged a carbohydrate consistent diet. Dexcom CGMS download reviewed. Will continue Tresiba 22 units daily but may need to decrease and will decrease Humalog to 5 units before breakfast, will continue 7 units before lunch and will increase to 9 units before dinner. Also encouraged treating bedtime snack with Humalog 2 units if carb amount is greater than 15 grams. Encouraged patient to keep in touch with blood glucose readings. Will continue with Dexcom G6 for better accuracy and safety given patient now has less hypoglycemia. Stable CMP and urine alb/cr ratio in March 2022 and will repeat today. \par \par 2. HTN- BP is at goal and will continue medications\par \par 3. Hyperlipidemia- Will check lipids and will continue statin\par \par 4. Hypothyroidism/Hashimoto's disease- Patient appears euthyroid. Will check TFTs. Will continue LT4 75 mcg daily for now. \par \par 5. Osteoporosis- DEXA scan performed in May 2022 shows left femoral neck -2.3 which is a 7.4% decrease and total hip -2.3 which is 4.2% decrease and 1/3 radius -3.5 which is stable. Given increased risk of fracture, will discontinue Alendronate 70 mg po weekly and start Denosumab. Reviewed risks and benefits of Denosumab including avoiding abrupt discontinuation causing bone loss and vertebral fractures. Encouraged weight bearing activity. Discussed appropriate calcium and vitamin D intake. Will continue vitamin D3 2,000 IU daily. Denosumab given today from our office supply. Will repeat DEXA scan 1 year after starting Denosumab. \par \par Follow up in 3 months for DM\par Follow up in 6 months for Denosumab\par Follow up with podiatry

## 2022-10-24 LAB
25(OH)D3 SERPL-MCNC: 36.2 NG/ML
ALBUMIN SERPL ELPH-MCNC: 3.8 G/DL
ALP BLD-CCNC: 80 U/L
ALT SERPL-CCNC: 15 U/L
ANION GAP SERPL CALC-SCNC: 11 MMOL/L
AST SERPL-CCNC: 19 U/L
BASOPHILS # BLD AUTO: 0.05 K/UL
BASOPHILS NFR BLD AUTO: 0.9 %
BILIRUB SERPL-MCNC: 0.4 MG/DL
BUN SERPL-MCNC: 18 MG/DL
CALCIUM SERPL-MCNC: 8.8 MG/DL
CHLORIDE SERPL-SCNC: 101 MMOL/L
CHOLEST SERPL-MCNC: 153 MG/DL
CO2 SERPL-SCNC: 25 MMOL/L
CREAT SERPL-MCNC: 0.54 MG/DL
CREAT SPEC-SCNC: 34 MG/DL
EGFR: 97 ML/MIN/1.73M2
EOSINOPHIL # BLD AUTO: 0.28 K/UL
EOSINOPHIL NFR BLD AUTO: 4.8 %
GLUCOSE SERPL-MCNC: 278 MG/DL
HCT VFR BLD CALC: 41.7 %
HDLC SERPL-MCNC: 76 MG/DL
HGB BLD-MCNC: 13.7 G/DL
IMM GRANULOCYTES NFR BLD AUTO: 0.5 %
LDLC SERPL CALC-MCNC: 39 MG/DL
LYMPHOCYTES # BLD AUTO: 1.91 K/UL
LYMPHOCYTES NFR BLD AUTO: 32.5 %
MAN DIFF?: NORMAL
MCHC RBC-ENTMCNC: 32.5 PG
MCHC RBC-ENTMCNC: 32.9 GM/DL
MCV RBC AUTO: 99 FL
MICROALBUMIN 24H UR DL<=1MG/L-MCNC: <1.2 MG/DL
MICROALBUMIN/CREAT 24H UR-RTO: NORMAL MG/G
MONOCYTES # BLD AUTO: 0.53 K/UL
MONOCYTES NFR BLD AUTO: 9 %
NEUTROPHILS # BLD AUTO: 3.08 K/UL
NEUTROPHILS NFR BLD AUTO: 52.3 %
NONHDLC SERPL-MCNC: 76 MG/DL
PLATELET # BLD AUTO: 185 K/UL
POTASSIUM SERPL-SCNC: 4.5 MMOL/L
PROT SERPL-MCNC: 6.5 G/DL
RBC # BLD: 4.21 M/UL
RBC # FLD: 13.3 %
SODIUM SERPL-SCNC: 137 MMOL/L
T4 FREE SERPL-MCNC: 1.4 NG/DL
TRIGL SERPL-MCNC: 186 MG/DL
TSH SERPL-ACNC: 2.31 UIU/ML
WBC # FLD AUTO: 5.88 K/UL

## 2022-10-24 RX ORDER — BLOOD-GLUCOSE METER
W/DEVICE EACH MISCELLANEOUS
Qty: 1 | Refills: 0 | Status: ACTIVE | COMMUNITY
Start: 2017-09-18 | End: 1900-01-01

## 2023-01-18 ENCOUNTER — APPOINTMENT (OUTPATIENT)
Dept: ENDOCRINOLOGY | Facility: CLINIC | Age: 73
End: 2023-01-18
Payer: MEDICARE

## 2023-01-18 VITALS
BODY MASS INDEX: 23.6 KG/M2 | SYSTOLIC BLOOD PRESSURE: 112 MMHG | WEIGHT: 125 LBS | HEART RATE: 72 BPM | OXYGEN SATURATION: 96 % | HEIGHT: 61 IN | DIASTOLIC BLOOD PRESSURE: 70 MMHG

## 2023-01-18 LAB — HBA1C MFR BLD HPLC: 8.1

## 2023-01-18 PROCEDURE — 95251 CONT GLUC MNTR ANALYSIS I&R: CPT

## 2023-01-18 PROCEDURE — 83036 HEMOGLOBIN GLYCOSYLATED A1C: CPT | Mod: QW

## 2023-01-18 PROCEDURE — 99215 OFFICE O/P EST HI 40 MIN: CPT | Mod: 25

## 2023-01-18 NOTE — PHYSICAL EXAM
[Alert] : alert [No Acute Distress] : no acute distress [Normal Sclera/Conjunctiva] : normal sclera/conjunctiva [EOMI] : extra ocular movement intact [No LAD] : no lymphadenopathy [Thyroid Not Enlarged] : the thyroid was not enlarged [No Thyroid Nodules] : no palpable thyroid nodules [No Respiratory Distress] : no respiratory distress [Clear to Auscultation] : lungs were clear to auscultation bilaterally [Normal S1, S2] : normal S1 and S2 [Regular Rhythm] : with a regular rhythm [No Edema] : no peripheral edema [Pedal Pulses Normal] : the pedal pulses are present [Normal Bowel Sounds] : normal bowel sounds [Not Tender] : non-tender [Not Distended] : not distended [Soft] : abdomen soft [Normal Anterior Cervical Nodes] : no anterior cervical lymphadenopathy [No Spinal Tenderness] : no spinal tenderness [Kyphosis] : kyphosis present [No Clubbing, Cyanosis] : no clubbing  or cyanosis of the fingernails [No Rash] : no rash [Right foot was examined, including] : right foot ~C was examined, including visual inspection with sensory and pulse exams [Left foot was examined, including] : left foot ~C was examined, including visual inspection with sensory and pulse exams [Normal] : normal [2+] : 2+ in the dorsalis pedis [Diminished Throughout Both Feet] : diminished tactile sensation with monofilament testing throughout both feet [Normal Reflexes] : deep tendon reflexes were 2+ and symmetric [Normal Affect] : the affect was normal [Normal Mood] : the mood was normal [de-identified] : 2/6 DANIEL [FreeTextEntry1] : bunion and callus [FreeTextEntry5] : bunion

## 2023-01-18 NOTE — HISTORY OF PRESENT ILLNESS
[Continuous Glucose Monitoring] : Continuous Glucose Monitoring: Yes [Dexcom] : Dexcom [FreeTextEntry1] : 72 y.o. female with h/o Type 1 1/2 DM diagnosed in 2009, osteoporosis, and hypothyroidism here for follow up visit. Had Moderna COVID-19 vaccine X  2 plus booster X 3. 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  Using Dexcom now and happy with it. On basal bolus regimen. Less hypoglycemia. Taking Tresiba 22 units daily. Takes Humalog 6 to 7 units before breakfast, 7 to 8 units before lunch and 8 units before dinner. Does have evening snack which includes 1/2 apple with no protein and typically does not cover with insulin. If FS is above 300 at bedtime, takes Humalog 4 to 5 units with lows in the morning. No complications. UTD with opthalmology and no retinopathy. No foot complaints. No proteinuria. UTD with cardiology (Dr. Loaiza at [place]) and had normal stress test. Reports weight is stable now. Had full GI work up for weight loss which was normal. No abdominal pain and no change in bowels. Had colonoscopy on 3/9/22 which showed internal hemorrhoids only with normal colon. \par \par In regards to osteoporosis, no falls or fractures. \par \par DEXA scan in July 2017 left femoral neck -2.2, total hip -2.0 and 1/3 radius -3.6 and spine -1.3 is falsely elevated. \par DEXA scan in July 2018 shows spine -1.1 which is stable with arthritis, left femoral neck -2.1 which is stable and total hip -2.0 which is stable and 1/3 radius -3.6 which is stable.  \par DEXA scan in July 2020 shows spine -0.9 which is stable with arthritis, left femoral neck -1.9 which is stable and total hip -2.0 which is stable and 1/3 radius -3.5 which is stable. \par DEXA scan in May 2022 shows left femoral neck -2.3 which is a 7.4% decrease and total hip -2.3 which is 4.2% decrease and 1/3 radius -3.5 which is stable. \par \par She was taking Alendronate 70 mg weekly from July 2017 until July 2022. She started Denosumab in October 2022. Follows with dentist and had implants in October 2019. Takes calcium 600 mg daily and vitamin D 2,000 Iu daily.\par \par In regards to hypothyroidism, takes LT4 75 mcg daily. Feeling good. Reports weight is stable. Does reports stress and anxiety.  [FreeTextEntry2] : 43 [FreeTextEntry3] : 56 [FreeTextEntry4] : 1 [de-identified] : 7.9%

## 2023-01-18 NOTE — ASSESSMENT
[Carbohydrate Consistent Diet] : carbohydrate consistent diet [Hypoglycemia Management] : hypoglycemia management [Diabetes Foot Care] : diabetes foot care [Denosumab Therapy] : Risks  and benefits of denosumab therapy were discussed with the patient including eczema, cellulitis, osteonecrosis of the jaw and atypical femur fractures [FreeTextEntry1] : 72 y.o. 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 \par \par 1. Type 1 1/2 DM- Fair control with Hba1c of 8.1% today. Encouraged a carbohydrate consistent diet. Dexcom CGMS download reviewed. Will continue Tresiba 22 units daily and will increase Humalog to 7 units before breakfast, 8 units before lunch and 9 units before dinner. Also encouraged treating bedtime snack with Humalog 2 units if carb amount is greater than 15 grams. Avoid late night over correction and recommend Humalog 2 units if blood glucose is > 300.  Encouraged patient to keep in touch with blood glucose readings. Will continue with Dexcom G6 for better accuracy and safety given patient now has less hypoglycemia. Stable CMP and urine alb/cr ratio in October 2022. \par \par 2. HTN- BP is at goal and will continue medications including ARB\par \par 3. Hyperlipidemia- Lipids are at goal and will continue Rosuvastatin 5 mg daily\par \par 4. Hypothyroidism/Hashimoto's disease- Patient is euthyroid.  Will continue LT4 75 mcg daily for now. \par \par 5. Osteoporosis- DEXA scan performed in May 2022 shows left femoral neck -2.3 which is a 7.4% decrease and total hip -2.3 which is 4.2% decrease and 1/3 radius -3.5 which is stable. Given increased risk of fracture, will discontinue Alendronate 70 mg po weekly and start Denosumab. Reviewed risks and benefits of Denosumab including avoiding abrupt discontinuation causing bone loss and vertebral fractures. Encouraged weight bearing activity. Discussed appropriate calcium and vitamin D intake. Will continue vitamin D3 2,000 IU daily. Denosumab given in October 2022 from our office supply. Will repeat DEXA scan 1 year after starting Denosumab. \par \par Follow up in 3 months for DM and Denosumab\par Follow up with podiatry

## 2023-01-26 ENCOUNTER — OUTPATIENT (OUTPATIENT)
Dept: OUTPATIENT SERVICES | Facility: HOSPITAL | Age: 73
LOS: 1 days | Discharge: HOME | End: 2023-01-26

## 2023-01-26 DIAGNOSIS — Z31.5 ENCOUNTER FOR PROCREATIVE GENETIC COUNSELING: ICD-10-CM

## 2023-01-31 ENCOUNTER — APPOINTMENT (OUTPATIENT)
Dept: HEMATOLOGY ONCOLOGY | Facility: CLINIC | Age: 73
End: 2023-01-31

## 2023-02-15 ENCOUNTER — APPOINTMENT (OUTPATIENT)
Dept: HEMATOLOGY ONCOLOGY | Facility: CLINIC | Age: 73
End: 2023-02-15

## 2023-02-15 ENCOUNTER — LABORATORY RESULT (OUTPATIENT)
Age: 73
End: 2023-02-15

## 2023-03-02 ENCOUNTER — RX RENEWAL (OUTPATIENT)
Age: 73
End: 2023-03-02

## 2023-03-07 ENCOUNTER — NON-APPOINTMENT (OUTPATIENT)
Age: 73
End: 2023-03-07

## 2023-03-07 NOTE — DISCUSSION/SUMMARY
[FreeTextEntry1] : REASON FOR CONSULT\par Talya Maria is a 73-year-old female whose daughter, Karla, was called per patient request 03/07/2023 for a discussion regarding her negative genetic testing results related to hereditary cancer predisposition. \par \par Ms. Maria was originally seen at [facility] on 02/15/2023 for hereditary cancer predisposition risk assessment. She has no personal history of cancer, but she has sister whose dematopathology of an adnexal neoplasm with sebaceous differentiation was suspicous for Muire-Zaid Solano Syndrome (MMR IHC MLH1 and PMS2 absent). Ms. Maria decided to pursue genetic testing using a guidelines-based breast and gyn cancers panel (19 genes) offered by [facility]. \par \par TEST RESULTS: NEGATIVE\par \par NO pathogenic (disease-causing) variants or VUSs were detected in the following genes: ELVIS*, BARD1, BRCA1, BRCA2, BRIP1, CDH1, CHEK2, EPCAM*, MLH1*, MSH2*, MSH6*, NF1*, PALB2, PMS2*, PTEN*, RAD51C, RAD51D, STK11, TP53.\par \par RESULTS INTERPRETATION AND ASSESSMENT:\par Given Ms. Maria’s personal and current reported family history of cancer, her negative genetic test results, and in the absence of other indications, she should practice age-appropriate cancer screening of other organ systems as recommended for the general population.\par \par We also discussed the limitations of negative results:\par 1.	The cause of Ms. Maria’s family history of cancer remains unknown. The cancer(s) may have developed randomly, or due to environmental factors.  \par 2.	This negative result does not completely rule out a hereditary basis for the reported personal and/or family history due to limitations in technology or a variant being present in an unidentified gene. \par 3.	Variants in other genes would not be identified by this analysis, so this negative result does not rule out the low likelihood of having a mutation in a different hereditary cancer gene or the possibility of ever developing cancer.\par 4.	It is possible there is a hereditary cancer predisposition gene mutation in the family, but the patient did not inherit it. \par \par We informed Ms. Maria’s daughter that our knowledge of genetics and inherited cancer conditions is changing rapidly. Therefore, we recommended that Ms. Maria contact our office, every 2 to 3 years, to discuss relevant advances in cancer genetics.  We emphasized the importance of re-contacting us with updates regarding her personal and family history of cancer as well as any updates regarding additional cancer genetic test results performed for the patient and/or family members.  Such updates could possibly change our risk assessment and recommendations. \par \par In addition, we discussed Ms. Maria sister, Sarmad, who had the suspicious Muire-Zaid Solano Syndrome dermatopathology finding consider pursuing cancer risk assessment genetic counseling with the option of genetic testing. \par \par PLAN:\par 1.	These results do not change Ms. Maria’s medical management. \par 2.	Patient informed consult note(s) will be available through their [employer] patient portal and genetic test results will be released via [facility]’s laboratory portal. \par 3.	Ms. Maria’s daughter was encouraged to contact us every 2-3 years to discuss relevant advances in cancer genetics, or sooner if there are any changes in his personal or family history of cancer.\par \par \par For any additional questions please call [facility] at [phone]. \par \par \par Yohan Doshi MS, [facility]\par Genetic Counselor, [facility]\par \par \par CC: \par Patient

## 2023-03-14 NOTE — DISCUSSION/SUMMARY
[FreeTextEntry1] : REASON FOR CONSULT\par Talya Maria is a 73-year-old female who was self-referred for cancer genetic counseling and risk assessment due to a family history of breast cancer and sebaceous findings suspicious for Muire-Zaid syndrome. She was accompanied by her daughter, Mia Issa. \par \par RELEVANT MEDICAL HISTORY\par Ms. Maria is a healthy individual who has never had cancer. Her sister, Kristofer, was found to have an adnexal neoplasm with sebaceous differentiation, nosing features of sebaceoma. Of note, her sister’s neoplasm was found to have absent MLH1 and PMS2 on IHC, which was noted “these findings support the possibility of underlying Suzan-Zaid Solano Syndrome. Ms. Maria stated her sister has not had germline genetic testing yet, but is in the process of scheduling an appointment with a Genetic Counselor in our service. \par \par OTHER MEDICAL AND SURGICAL HISTORY:\par GERD. Vocal cord polyp. Type 2 diabetes. Hypothyroidism. Osteoporosis. HTN. HLD. Cataract surgery. Left breast biopsy 10/21/2019, benign. Right breast benign biopsy 3 years before left breast biopsy.\par \par PAST OB/GYN HISTORY:\par Obstetrical History: \par Age at Menarche: 10\par Menopausal with LMP at age [age]\par Age at First Live Birth: 24\par Oral Contraceptive Use: No\par Hormone Replacement Therapy: Yes, estrogen only patch for 2 years total in her early 50s. \par \par CANCER SCREENING HISTORY:  \par Breast: Last mammogram and sonogram 10/14/2022, almost entirely fatty breast tissue noted, otherwise normal. Frequency: yearly. \par GYN: Last visit 10/2022, normal. Frequency: yearly. \par Colon: Last colonoscopy 2022, no polyps noted. H/o 1-2 noncancerous polyps reported. Frequency: previously every 3 years now every 5 years. \par Skin: No full body exam noted. H/o of noncancerous lesions removed previously. \par \par SOCIAL HISTORY:\par •	Tobacco-product use: No\par \par FAMILY HISTORY:\par Maternal and paternal ancestry was reported as [place]. No Ashkenazi Denominational heritage reported. A detailed family history of cancer was ascertained, see below and scanned chart for pedigree. \par \par To Ms. Maria’s knowledge, no one in the family has had germline testing for cancer susceptibility.  \par 	\par RISK ASSESSMENT:\par Ms. Maria’s family history of a sister with a Solano Syndrome-suspicious sebaceoma and family history of breast cancer is suggestive of more than one hereditary cancer predisposition syndrome. Although we recommended Ms. Maria’s sister pursue genetic testing first, she stated she is unaware of when she will pursue testing and would like to have this information for her children. We therefore recommended genetic testing for a guidelines-based breast and gyn cancers panel. This test analyzes 19 genes: ELVIS*, BARD1, BRCA1, BRCA2, BRIP1, CDH1, CHEK2, EPCAM*, MLH1*, MSH2*, MSH6*, NF1*, PALB2, PMS2*, PTEN*, RAD51C, RAD51D, STK11, TP53.\par \par We discussed the risks, benefits and limitations, and implications of genetic testing. We also discussed the psychosocial implications of genetic testing. Possible test results were reviewed with Ms. Maria, along with associated medical management options. The Genetic Information Non-discrimination Act ([place]) was also reviewed.\par \par Ms. Maria consented to the above-mentioned genetic testing panel. Blood was drawn in our laboratory and sent to [facility] today.\par \par PLAN:\par \par 1.	Blood drawn today will be sent to [facility] for analysis. \par 2.	We will contact Ms. Maria the results are available and will schedule a follow-up appointment, as needed. Results generally return in 2-3 weeks from the day the sample kit is mailed.\par 3.	Ms. Maria signed a medical release to allow discussion of her genetics information with her daughter, Mia Issa, and sister, Sarmad Berumen. This will be scanned into her chart.\par 4.	Family member’s report(s) will be scanned to [facility] secure file cabinet.\par \par \par For any additional questions please call [facility] at [phone]. \par \par \par Yohan Doshi MS, [facility]\par Genetic Counselor, [facility]

## 2023-04-06 ENCOUNTER — RX RENEWAL (OUTPATIENT)
Age: 73
End: 2023-04-06

## 2023-04-26 ENCOUNTER — APPOINTMENT (OUTPATIENT)
Dept: ENDOCRINOLOGY | Facility: CLINIC | Age: 73
End: 2023-04-26
Payer: MEDICARE

## 2023-04-26 VITALS
SYSTOLIC BLOOD PRESSURE: 122 MMHG | HEART RATE: 65 BPM | WEIGHT: 124 LBS | BODY MASS INDEX: 23.41 KG/M2 | DIASTOLIC BLOOD PRESSURE: 66 MMHG | HEIGHT: 61 IN | OXYGEN SATURATION: 94 %

## 2023-04-26 LAB
GLUCOSE BLDC GLUCOMTR-MCNC: 295
HBA1C MFR BLD HPLC: 8

## 2023-04-26 PROCEDURE — 99215 OFFICE O/P EST HI 40 MIN: CPT | Mod: 25

## 2023-04-26 PROCEDURE — 96372 THER/PROPH/DIAG INJ SC/IM: CPT

## 2023-04-26 PROCEDURE — 95251 CONT GLUC MNTR ANALYSIS I&R: CPT

## 2023-04-26 PROCEDURE — 83036 HEMOGLOBIN GLYCOSYLATED A1C: CPT | Mod: QW

## 2023-04-26 RX ORDER — DENOSUMAB 60 MG/ML
60 INJECTION SUBCUTANEOUS
Qty: 1 | Refills: 0 | Status: COMPLETED | OUTPATIENT
Start: 2023-04-26

## 2023-04-26 RX ADMIN — DENOSUMAB 0 MG/ML: 60 INJECTION SUBCUTANEOUS at 00:00

## 2023-04-26 NOTE — HISTORY OF PRESENT ILLNESS
[Continuous Glucose Monitoring] : Continuous Glucose Monitoring: Yes [Dexcom] : Dexcom [FreeTextEntry1] : 73 y.o. female with h/o Type 1 1/2 DM diagnosed in 2009, osteoporosis, and hypothyroidism here for follow up visit. Had Moderna COVID-19 vaccine X  2 plus booster X 3. 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  Using Dexcom G6 now and happy with it. On basal bolus regimen. Less hypoglycemia. Taking Tresiba 22 units daily. Takes Humalog  7 units before breakfast,  8 units before lunch and 8 to 9 units before dinner. Does have evening snack and typically does not cover with insulin. If FS is above 300 at bedtime, takes Humalog 2 units. No complications. UTD with opthalmology and no retinopathy. No foot complaints. No proteinuria. UTD with cardiology (Dr. Loaiza at [place]) and had normal stress test. Reports weight is stable now. Had full GI work up for weight loss which was normal. No abdominal pain and no change in bowels. Had colonoscopy on 3/9/22 which showed internal hemorrhoids only with normal colon. \par \par In regards to osteoporosis, no falls or fractures. \par \par DEXA scan in July 2017 left femoral neck -2.2, total hip -2.0 and 1/3 radius -3.6 and spine -1.3 is falsely elevated. \par DEXA scan in July 2018 shows spine -1.1 which is stable with arthritis, left femoral neck -2.1 which is stable and total hip -2.0 which is stable and 1/3 radius -3.6 which is stable.  \par DEXA scan in July 2020 shows spine -0.9 which is stable with arthritis, left femoral neck -1.9 which is stable and total hip -2.0 which is stable and 1/3 radius -3.5 which is stable. \par DEXA scan in May 2022 shows left femoral neck -2.3 which is a 7.4% decrease and total hip -2.3 which is 4.2% decrease and 1/3 radius -3.5 which is stable. \par \par She was taking Alendronate 70 mg weekly from July 2017 until July 2022. She started Denosumab in October 2022. Follows with dentist and had implants in October 2019. Takes calcium 600 mg daily and vitamin D 2,000 Iu daily.\par \par In regards to hypothyroidism, takes LT4 75 mcg daily. Feeling good. Reports weight is stable. Does reports stress and anxiety.  [FreeTextEntry2] : 32 [FreeTextEntry3] : 68 [FreeTextEntry4] : 0

## 2023-04-26 NOTE — PHYSICAL EXAM
[Alert] : alert [No Acute Distress] : no acute distress [Normal Sclera/Conjunctiva] : normal sclera/conjunctiva [EOMI] : extra ocular movement intact [No LAD] : no lymphadenopathy [Thyroid Not Enlarged] : the thyroid was not enlarged [No Thyroid Nodules] : no palpable thyroid nodules [No Respiratory Distress] : no respiratory distress [Clear to Auscultation] : lungs were clear to auscultation bilaterally [Normal S1, S2] : normal S1 and S2 [Regular Rhythm] : with a regular rhythm [No Edema] : no peripheral edema [Pedal Pulses Normal] : the pedal pulses are present [Normal Bowel Sounds] : normal bowel sounds [Not Tender] : non-tender [Not Distended] : not distended [Soft] : abdomen soft [Normal Anterior Cervical Nodes] : no anterior cervical lymphadenopathy [No Spinal Tenderness] : no spinal tenderness [Kyphosis] : kyphosis present [No Clubbing, Cyanosis] : no clubbing  or cyanosis of the fingernails [No Rash] : no rash [Right foot was examined, including] : right foot ~C was examined, including visual inspection with sensory and pulse exams [Left foot was examined, including] : left foot ~C was examined, including visual inspection with sensory and pulse exams [Normal] : normal [2+] : 2+ in the dorsalis pedis [Diminished Throughout Both Feet] : diminished tactile sensation with monofilament testing throughout both feet [Normal Reflexes] : deep tendon reflexes were 2+ and symmetric [Normal Affect] : the affect was normal [Normal Mood] : the mood was normal [de-identified] : 2/6 DANIEL [FreeTextEntry1] : bunion and callus [FreeTextEntry5] : bunion

## 2023-04-26 NOTE — ASSESSMENT
[Carbohydrate Consistent Diet] : carbohydrate consistent diet [Hypoglycemia Management] : hypoglycemia management [Diabetes Foot Care] : diabetes foot care [Denosumab Therapy] : Risks  and benefits of denosumab therapy were discussed with the patient including eczema, cellulitis, osteonecrosis of the jaw and atypical femur fractures [FreeTextEntry1] : 73 y.o. 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 \par \par 1. Type 1 1/2 DM- Fair control with Hba1c of 8% today. Encouraged a carbohydrate consistent diet. Dexcom CGMS download reviewed with 32% in range. Will continue Tresiba 22 units daily and will increase Humalog to 8 units before breakfast, 9 units before lunch and 10 units before dinner. Also encouraged treating bedtime snack with Humalog 2 units if carb amount is greater than 15 grams. Avoid late night over correction and recommend Humalog 2 units if blood glucose is > 300.  Encouraged patient to keep in touch with blood glucose readings. Will upgrade to Dexcom G7 for better accuracy and safety given patient now has less hypoglycemia. Stable CMP and urine alb/cr ratio in October 2022 and will repeat today. \par \par 2. HTN- BP is at goal and will continue medications including ARB\par \par 3. Hyperlipidemia- Will check lipids and will continue Rosuvastatin 5 mg daily\par \par 4. Hypothyroidism/Hashimoto's disease- Patient appears euthyroid.  Will continue LT4 75 mcg daily for now. Will check TFTs\par \par 5. Osteoporosis- DEXA scan performed in May 2022 shows left femoral neck -2.3 which is a 7.4% decrease and total hip -2.3 which is 4.2% decrease and 1/3 radius -3.5 which is stable. Given increased risk of fracture, we discontinued Alendronate 70 mg po weekly and started Denosumab. Reviewed risks and benefits of Denosumab including avoiding abrupt discontinuation causing bone loss and vertebral fractures. Encouraged weight bearing activity. Discussed appropriate calcium and vitamin D intake. Will continue vitamin D3 2,000 IU daily. Denosumab given today from our office supply. Will repeat DEXA scan 1 year after starting Denosumab in the fall of 2023. Will check CMP/serum calcium and 25 vitamin D. \par \par Follow up in 3 months for DM and 6 months for Denosumab\par Follow up with podiatry

## 2023-04-27 LAB
25(OH)D3 SERPL-MCNC: 35.7 NG/ML
ALBUMIN SERPL ELPH-MCNC: 4.3 G/DL
ALP BLD-CCNC: 65 U/L
ALT SERPL-CCNC: 11 U/L
ANION GAP SERPL CALC-SCNC: 11 MMOL/L
AST SERPL-CCNC: 17 U/L
BASOPHILS # BLD AUTO: 0.06 K/UL
BASOPHILS NFR BLD AUTO: 1.1 %
BILIRUB SERPL-MCNC: 0.6 MG/DL
BUN SERPL-MCNC: 15 MG/DL
CALCIUM SERPL-MCNC: 9.7 MG/DL
CHLORIDE SERPL-SCNC: 106 MMOL/L
CHOLEST SERPL-MCNC: 157 MG/DL
CO2 SERPL-SCNC: 27 MMOL/L
CREAT SERPL-MCNC: 0.51 MG/DL
CREAT SPEC-SCNC: 27 MG/DL
EGFR: 97 ML/MIN/1.73M2
EOSINOPHIL # BLD AUTO: 0.31 K/UL
EOSINOPHIL NFR BLD AUTO: 5.8 %
GLUCOSE SERPL-MCNC: 242 MG/DL
HCT VFR BLD CALC: 43.9 %
HDLC SERPL-MCNC: 87 MG/DL
HGB BLD-MCNC: 14.2 G/DL
IMM GRANULOCYTES NFR BLD AUTO: 0.4 %
LDLC SERPL CALC-MCNC: 54 MG/DL
LYMPHOCYTES # BLD AUTO: 2.26 K/UL
LYMPHOCYTES NFR BLD AUTO: 42 %
MAN DIFF?: NORMAL
MCHC RBC-ENTMCNC: 32.3 GM/DL
MCHC RBC-ENTMCNC: 32.5 PG
MCV RBC AUTO: 100.5 FL
MICROALBUMIN 24H UR DL<=1MG/L-MCNC: <1.2 MG/DL
MICROALBUMIN/CREAT 24H UR-RTO: NORMAL MG/G
MONOCYTES # BLD AUTO: 0.46 K/UL
MONOCYTES NFR BLD AUTO: 8.6 %
NEUTROPHILS # BLD AUTO: 2.27 K/UL
NEUTROPHILS NFR BLD AUTO: 42.1 %
NONHDLC SERPL-MCNC: 70 MG/DL
PLATELET # BLD AUTO: 184 K/UL
POTASSIUM SERPL-SCNC: 4.6 MMOL/L
PROT SERPL-MCNC: 6.9 G/DL
RBC # BLD: 4.37 M/UL
RBC # FLD: 12.9 %
SODIUM SERPL-SCNC: 143 MMOL/L
T4 FREE SERPL-MCNC: 1.3 NG/DL
TRIGL SERPL-MCNC: 81 MG/DL
TSH SERPL-ACNC: 3.73 UIU/ML
WBC # FLD AUTO: 5.38 K/UL

## 2023-08-02 ENCOUNTER — APPOINTMENT (OUTPATIENT)
Dept: ENDOCRINOLOGY | Facility: CLINIC | Age: 73
End: 2023-08-02
Payer: MEDICARE

## 2023-08-02 VITALS
DIASTOLIC BLOOD PRESSURE: 68 MMHG | WEIGHT: 119 LBS | HEIGHT: 61 IN | BODY MASS INDEX: 22.47 KG/M2 | HEART RATE: 64 BPM | SYSTOLIC BLOOD PRESSURE: 120 MMHG

## 2023-08-02 LAB — HBA1C MFR BLD HPLC: 8.9

## 2023-08-02 PROCEDURE — 83036 HEMOGLOBIN GLYCOSYLATED A1C: CPT | Mod: QW

## 2023-08-02 PROCEDURE — 95251 CONT GLUC MNTR ANALYSIS I&R: CPT

## 2023-08-02 PROCEDURE — 99215 OFFICE O/P EST HI 40 MIN: CPT | Mod: 25

## 2023-08-02 RX ORDER — INSULIN DEGLUDEC INJECTION 100 U/ML
100 INJECTION, SOLUTION SUBCUTANEOUS
Qty: 45 | Refills: 3 | Status: ACTIVE | COMMUNITY
Start: 2019-07-12 | End: 1900-01-01

## 2023-08-03 NOTE — PHYSICAL EXAM
[Alert] : alert [No Acute Distress] : no acute distress [Normal Sclera/Conjunctiva] : normal sclera/conjunctiva [EOMI] : extra ocular movement intact [No LAD] : no lymphadenopathy [Thyroid Not Enlarged] : the thyroid was not enlarged [No Thyroid Nodules] : no palpable thyroid nodules [No Respiratory Distress] : no respiratory distress [Clear to Auscultation] : lungs were clear to auscultation bilaterally [Normal S1, S2] : normal S1 and S2 [Regular Rhythm] : with a regular rhythm [No Edema] : no peripheral edema [Pedal Pulses Normal] : the pedal pulses are present [Normal Bowel Sounds] : normal bowel sounds [Not Tender] : non-tender [Not Distended] : not distended [Soft] : abdomen soft [Normal Anterior Cervical Nodes] : no anterior cervical lymphadenopathy [No Spinal Tenderness] : no spinal tenderness [Kyphosis] : kyphosis present [No Clubbing, Cyanosis] : no clubbing  or cyanosis of the fingernails [No Rash] : no rash [Right foot was examined, including] : right foot ~C was examined, including visual inspection with sensory and pulse exams [Left foot was examined, including] : left foot ~C was examined, including visual inspection with sensory and pulse exams [2+] : 2+ in the dorsalis pedis [Normal Reflexes] : deep tendon reflexes were 2+ and symmetric [Normal Affect] : the affect was normal [Normal Mood] : the mood was normal [Swelling] : swollen [Diminished Throughout Both Feet] : normal tactile sensation with monofilament testing throughout both feet [de-identified] : 2/6 DANIEL [FreeTextEntry1] : bunion [FreeTextEntry2] : hammer toes [FreeTextEntry5] : bunion [FreeTextEntry6] : hammer toes

## 2023-08-03 NOTE — ASSESSMENT
[Carbohydrate Consistent Diet] : carbohydrate consistent diet [Hypoglycemia Management] : hypoglycemia management [Diabetes Foot Care] : diabetes foot care [Denosumab Therapy] : Risks  and benefits of denosumab therapy were discussed with the patient including eczema, cellulitis, osteonecrosis of the jaw and atypical femur fractures [FreeTextEntry1] : 73 y.o. 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   1. Type 1 1/2 DM- Suboptimal control with Hba1c of 8.9% today. Encouraged a carbohydrate consistent diet. Dexcom CGMS download reviewed with 27% in range. Will continue Tresiba 22 units daily and will increase Humalog to 8 to 9 units before breakfast, 9 units before lunch and 10 units before dinner. Recommend taking Humalog 10 to 15 minutes before eating. Also encouraged treating bedtime snack with Humalog 2 units if carb amount is greater than 15 grams. Avoid late night over correction and recommend Humalog 2 units if blood glucose is > 300.  Encouraged patient to keep in touch with blood glucose readings. Will upgrade to Dexcom G7 for better accuracy and safety given patient now has less hypoglycemia. Stable CMP and urine alb/cr ratio in April 2023.  2. HTN- BP is at goal and will continue medications including ARB  3. Hyperlipidemia- Lipids are at goal and will continue Rosuvastatin 5 mg daily  4. Hypothyroidism/Hashimoto's disease- Patient is euthyroid.  Will continue LT4 75 mcg daily for now.   5. Osteoporosis- DEXA scan performed in May 2022 shows left femoral neck -2.3 which is a 7.4% decrease and total hip -2.3 which is 4.2% decrease and 1/3 radius -3.5 which is stable. Given increased risk of fracture, we discontinued Alendronate 70 mg po weekly and started Denosumab in October 2022. Reviewed risks and benefits of Denosumab including avoiding abrupt discontinuation causing bone loss and vertebral fractures. Encouraged weight bearing activity. Discussed appropriate calcium and vitamin D intake. Will continue vitamin D3 2,000 IU daily. Denosumab last given in April 2023 from our office supply. Will repeat DEXA scan 1 year after starting Denosumab in the fall of 2023. Normal CMP/serum calcium and 25 vitamin D.   Follow up in 3 months for DM and for Denosumab

## 2023-08-03 NOTE — REVIEW OF SYSTEMS
[Recent Weight Loss (___ Lbs)] : recent weight loss: [unfilled] lbs [Hair Loss] : hair loss [Pain/Numbness of Digits] : pain/numbness of digits [Anxiety] : anxiety [Stress] : stress [Negative] : Endocrine [Fatigue] : no fatigue [Recent Weight Gain (___ Lbs)] : no recent weight gain [Polyuria] : no polyuria [Swelling] : no swelling

## 2023-08-03 NOTE — HISTORY OF PRESENT ILLNESS
[Continuous Glucose Monitoring] : Continuous Glucose Monitoring: Yes [Dexcom] : Dexcom [FreeTextEntry1] : 73 y.o. female with h/o Type 1 1/2 DM diagnosed in 2009, osteoporosis, and hypothyroidism here for follow up visit. Had Moderna COVID-19 vaccine X  2 plus booster X 3. 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  Using Dexcom G6 now. On basal bolus regimen. Less hypoglycemia. She had the flu in June 2023.   Taking Tresiba 22 units daily. Takes Humalog 7 to 8 units before breakfast, 8 units before lunch and 8 to 9 units before dinner. Does have evening snack and typically does not cover with insulin. If FS is above 300 at bedtime, takes Humalog 2 units. No complications. UTD with ophthalmology and no retinopathy. No foot complaints. Did see podiatry.  No proteinuria. UTD with cardiology (Dr. Loaiza at [place]) and had normal stress test. Reports weight is down now. Had full GI work up for weight loss which was normal. No abdominal pain and no change in bowels. Had colonoscopy on 3/9/22 which showed internal hemorrhoids only with normal colon.   In regards to osteoporosis, no falls or fractures.   DEXA scan in July 2017 left femoral neck -2.2, total hip -2.0 and 1/3 radius -3.6 and spine -1.3 is falsely elevated.  DEXA scan in July 2018 shows spine -1.1 which is stable with arthritis, left femoral neck -2.1 which is stable and total hip -2.0 which is stable and 1/3 radius -3.6 which is stable.   DEXA scan in July 2020 shows spine -0.9 which is stable with arthritis, left femoral neck -1.9 which is stable and total hip -2.0 which is stable and 1/3 radius -3.5 which is stable.  DEXA scan in May 2022 shows left femoral neck -2.3 which is a 7.4% decrease and total hip -2.3 which is 4.2% decrease and 1/3 radius -3.5 which is stable.   She was taking Alendronate 70 mg weekly from July 2017 until July 2022. She started Denosumab in October 2022. Follows with dentist and had implants in October 2019. Takes calcium 600 mg daily and vitamin D 2,000 Iu daily.   In regards to hypothyroidism, takes LT4 75 mcg daily. Feeling good. Reports weight is down. Does report stress and anxiety.  [FreeTextEntry2] : 27 [FreeTextEntry3] : 72 [FreeTextEntry4] : 1 [de-identified] : 8.8%

## 2023-08-17 ENCOUNTER — APPOINTMENT (OUTPATIENT)
Dept: ENDOCRINOLOGY | Facility: CLINIC | Age: 73
End: 2023-08-17
Payer: MEDICARE

## 2023-08-17 PROCEDURE — G0108 DIAB MANAGE TRN  PER INDIV: CPT

## 2023-08-18 VITALS — HEIGHT: 61 IN | WEIGHT: 123 LBS | BODY MASS INDEX: 23.22 KG/M2

## 2023-10-16 ENCOUNTER — RESULT REVIEW (OUTPATIENT)
Age: 73
End: 2023-10-16

## 2023-10-16 ENCOUNTER — OUTPATIENT (OUTPATIENT)
Dept: OUTPATIENT SERVICES | Facility: HOSPITAL | Age: 73
LOS: 1 days | End: 2023-10-16
Payer: MEDICARE

## 2023-10-16 ENCOUNTER — APPOINTMENT (OUTPATIENT)
Dept: MAMMOGRAPHY | Facility: IMAGING CENTER | Age: 73
End: 2023-10-16
Payer: MEDICARE

## 2023-10-16 ENCOUNTER — APPOINTMENT (OUTPATIENT)
Dept: ULTRASOUND IMAGING | Facility: IMAGING CENTER | Age: 73
End: 2023-10-16
Payer: MEDICARE

## 2023-10-16 DIAGNOSIS — Z12.31 ENCOUNTER FOR SCREENING MAMMOGRAM FOR MALIGNANT NEOPLASM OF BREAST: ICD-10-CM

## 2023-10-16 DIAGNOSIS — N60.19 DIFFUSE CYSTIC MASTOPATHY OF UNSPECIFIED BREAST: ICD-10-CM

## 2023-10-16 PROCEDURE — 77063 BREAST TOMOSYNTHESIS BI: CPT | Mod: 26

## 2023-10-16 PROCEDURE — 77067 SCR MAMMO BI INCL CAD: CPT | Mod: 26

## 2023-10-16 PROCEDURE — 77067 SCR MAMMO BI INCL CAD: CPT

## 2023-10-16 PROCEDURE — 77063 BREAST TOMOSYNTHESIS BI: CPT

## 2023-11-08 ENCOUNTER — APPOINTMENT (OUTPATIENT)
Dept: ENDOCRINOLOGY | Facility: CLINIC | Age: 73
End: 2023-11-08
Payer: MEDICARE

## 2023-11-08 VITALS
BODY MASS INDEX: 23.33 KG/M2 | DIASTOLIC BLOOD PRESSURE: 68 MMHG | WEIGHT: 122 LBS | OXYGEN SATURATION: 96 % | SYSTOLIC BLOOD PRESSURE: 134 MMHG | HEIGHT: 60.6 IN | HEART RATE: 64 BPM

## 2023-11-08 LAB — HBA1C MFR BLD HPLC: 8.9

## 2023-11-08 PROCEDURE — 96372 THER/PROPH/DIAG INJ SC/IM: CPT

## 2023-11-08 PROCEDURE — 95251 CONT GLUC MNTR ANALYSIS I&R: CPT

## 2023-11-08 PROCEDURE — 77080 DXA BONE DENSITY AXIAL: CPT

## 2023-11-08 PROCEDURE — 83036 HEMOGLOBIN GLYCOSYLATED A1C: CPT | Mod: QW

## 2023-11-08 PROCEDURE — ZZZZZ: CPT

## 2023-11-08 PROCEDURE — 99215 OFFICE O/P EST HI 40 MIN: CPT | Mod: 25

## 2023-11-08 RX ORDER — DENOSUMAB 60 MG/ML
60 INJECTION SUBCUTANEOUS
Qty: 1 | Refills: 0 | Status: COMPLETED | OUTPATIENT
Start: 2023-11-08

## 2023-11-08 RX ORDER — ROSUVASTATIN CALCIUM 5 MG/1
5 TABLET, FILM COATED ORAL
Qty: 90 | Refills: 3 | Status: ACTIVE | COMMUNITY
Start: 2017-07-03 | End: 1900-01-01

## 2023-11-08 RX ORDER — LEVOTHYROXINE SODIUM 0.07 MG/1
75 TABLET ORAL
Qty: 90 | Refills: 3 | Status: ACTIVE | COMMUNITY
Start: 2017-07-03 | End: 1900-01-01

## 2023-11-08 RX ADMIN — DENOSUMAB 0 MG/ML: 60 INJECTION SUBCUTANEOUS at 00:00

## 2023-11-09 LAB
25(OH)D3 SERPL-MCNC: 36.7 NG/ML
ALBUMIN SERPL ELPH-MCNC: 4.1 G/DL
ALP BLD-CCNC: 71 U/L
ALT SERPL-CCNC: 15 U/L
ANION GAP SERPL CALC-SCNC: 10 MMOL/L
AST SERPL-CCNC: 21 U/L
BASOPHILS # BLD AUTO: 0.05 K/UL
BASOPHILS NFR BLD AUTO: 0.9 %
BILIRUB SERPL-MCNC: 0.4 MG/DL
BUN SERPL-MCNC: 13 MG/DL
CALCIUM SERPL-MCNC: 9.5 MG/DL
CHLORIDE SERPL-SCNC: 101 MMOL/L
CHOLEST SERPL-MCNC: 162 MG/DL
CO2 SERPL-SCNC: 27 MMOL/L
CREAT SERPL-MCNC: 0.74 MG/DL
EGFR: 84 ML/MIN/1.73M2
EOSINOPHIL # BLD AUTO: 0.25 K/UL
EOSINOPHIL NFR BLD AUTO: 4.3 %
GLUCOSE SERPL-MCNC: 268 MG/DL
HCT VFR BLD CALC: 40.1 %
HDLC SERPL-MCNC: 77 MG/DL
HGB BLD-MCNC: 13.5 G/DL
IMM GRANULOCYTES NFR BLD AUTO: 0.5 %
LDLC SERPL CALC-MCNC: 62 MG/DL
LYMPHOCYTES # BLD AUTO: 2.2 K/UL
LYMPHOCYTES NFR BLD AUTO: 38.1 %
MAN DIFF?: NORMAL
MCHC RBC-ENTMCNC: 32.9 PG
MCHC RBC-ENTMCNC: 33.7 GM/DL
MCV RBC AUTO: 97.8 FL
MONOCYTES # BLD AUTO: 0.47 K/UL
MONOCYTES NFR BLD AUTO: 8.1 %
NEUTROPHILS # BLD AUTO: 2.78 K/UL
NEUTROPHILS NFR BLD AUTO: 48.1 %
NONHDLC SERPL-MCNC: 85 MG/DL
PLATELET # BLD AUTO: 163 K/UL
POTASSIUM SERPL-SCNC: 4.1 MMOL/L
PROT SERPL-MCNC: 6.6 G/DL
RBC # BLD: 4.1 M/UL
RBC # FLD: 13.2 %
SODIUM SERPL-SCNC: 137 MMOL/L
T4 FREE SERPL-MCNC: 1.5 NG/DL
TRIGL SERPL-MCNC: 137 MG/DL
TSH SERPL-ACNC: 2.21 UIU/ML
WBC # FLD AUTO: 5.78 K/UL

## 2023-11-16 ENCOUNTER — OUTPATIENT (OUTPATIENT)
Dept: OUTPATIENT SERVICES | Facility: HOSPITAL | Age: 73
LOS: 1 days | End: 2023-11-16
Payer: MEDICARE

## 2023-11-16 ENCOUNTER — APPOINTMENT (OUTPATIENT)
Dept: ULTRASOUND IMAGING | Facility: IMAGING CENTER | Age: 73
End: 2023-11-16
Payer: MEDICARE

## 2023-11-16 ENCOUNTER — RESULT REVIEW (OUTPATIENT)
Age: 73
End: 2023-11-16

## 2023-11-16 DIAGNOSIS — Z00.8 ENCOUNTER FOR OTHER GENERAL EXAMINATION: ICD-10-CM

## 2023-11-16 PROCEDURE — 76641 ULTRASOUND BREAST COMPLETE: CPT | Mod: 26,50

## 2023-11-16 PROCEDURE — 76641 ULTRASOUND BREAST COMPLETE: CPT

## 2023-11-20 ENCOUNTER — APPOINTMENT (OUTPATIENT)
Dept: ENDOCRINOLOGY | Facility: CLINIC | Age: 73
End: 2023-11-20

## 2024-02-07 ENCOUNTER — APPOINTMENT (OUTPATIENT)
Dept: ENDOCRINOLOGY | Facility: CLINIC | Age: 74
End: 2024-02-07
Payer: MEDICARE

## 2024-02-07 VITALS
HEIGHT: 60.6 IN | DIASTOLIC BLOOD PRESSURE: 62 MMHG | BODY MASS INDEX: 23.33 KG/M2 | HEART RATE: 64 BPM | WEIGHT: 122 LBS | SYSTOLIC BLOOD PRESSURE: 124 MMHG | OXYGEN SATURATION: 98 %

## 2024-02-07 LAB — HBA1C MFR BLD HPLC: 9

## 2024-02-07 PROCEDURE — 99215 OFFICE O/P EST HI 40 MIN: CPT

## 2024-02-07 PROCEDURE — 83036 HEMOGLOBIN GLYCOSYLATED A1C: CPT | Mod: QW

## 2024-02-07 PROCEDURE — 95251 CONT GLUC MNTR ANALYSIS I&R: CPT

## 2024-02-07 RX ORDER — INSULIN LISPRO 100 [IU]/ML
100 INJECTION, SOLUTION INTRAVENOUS; SUBCUTANEOUS
Qty: 4 | Refills: 3 | Status: ACTIVE | COMMUNITY
Start: 2017-07-03 | End: 1900-01-01

## 2024-02-07 RX ORDER — INSULIN PMP CART,AUT,G6/7,CNTR
EACH SUBCUTANEOUS
Qty: 1 | Refills: 0 | Status: ACTIVE | COMMUNITY
Start: 2024-02-07 | End: 1900-01-01

## 2024-02-07 RX ORDER — INSULIN PMP CART,AUT,G6/7,CNTR
EACH SUBCUTANEOUS
Qty: 9 | Refills: 3 | Status: ACTIVE | COMMUNITY
Start: 2024-02-07 | End: 1900-01-01

## 2024-02-07 RX ORDER — IRBESARTAN 75 MG/1
75 TABLET ORAL
Qty: 90 | Refills: 3 | Status: ACTIVE | COMMUNITY
Start: 2017-07-03 | End: 1900-01-01

## 2024-02-07 RX ORDER — ATENOLOL 25 MG/1
25 TABLET ORAL
Qty: 90 | Refills: 3 | Status: ACTIVE | COMMUNITY
Start: 2017-07-03 | End: 1900-01-01

## 2024-02-07 NOTE — HISTORY OF PRESENT ILLNESS
[Continuous Glucose Monitoring] : Continuous Glucose Monitoring: Yes [Dexcom] : Dexcom [FreeTextEntry1] : 74 y.o. female with h/o Type 1.5 DM diagnosed in 2009, osteoporosis, and hypothyroidism here for follow up visit.   Had Moderna COVID-19 vaccine X  2 plus booster X 3. 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 She had the flu in June 2023.   Using Dexcom G7 now but not as happy with it given small  and loss of blue tooth connection. Notes significant hyperglycemia.   Taking Toujeo 26 units daily. Takes Humalog 10 units before breakfast, 10 units before lunch and 14 units before dinner. Does have evening snack and typically does not cover with insulin. If FS is above 300 at bedtime, takes Humalog 2 units.   No complications. UTD with ophthalmology and no retinopathy. No foot complaints. Did see podiatry.  No proteinuria. UTD with cardiology (Dr. Loaiza at [place]) and had normal stress test. Reports weight is stable now. Had full GI work up for weight loss which was normal. No abdominal pain and no change in bowels. Had colonoscopy on 3/9/22 which showed internal hemorrhoids only with normal colon.   In regard to osteoporosis, no falls or fractures.   DEXA scan in July 2017 left femoral neck -2.2, total hip -2.0 and 1/3 radius -3.6 and spine -1.3 is falsely elevated.  DEXA scan in July 2018 shows spine -1.1 which is stable with arthritis, left femoral neck -2.1 which is stable and total hip -2.0 which is stable and 1/3 radius -3.6 which is stable.   DEXA scan in July 2020 shows spine -0.9 which is stable with arthritis, left femoral neck -1.9 which is stable and total hip -2.0 which is stable and 1/3 radius -3.5 which is stable.  DEXA scan in May 2022 shows left femoral neck -2.3 which is a 7.4% decrease and total hip -2.3 which is 4.2% decrease and 1/3 radius -3.5 which is stable.  DEXA scan in November 2023 shows left femoral neck -2.3 and total hip -2.3 which are stable and 1/3 radius -3.5 which is stable.    She was taking Alendronate 70 mg weekly from July 2017 until July 2022.  She started Denosumab in October 2022.  Follows with dentist and had implants in October 2019. Takes calcium 600 mg daily and vitamin D 2,000 Iu daily.   In regard to hypothyroidism, takes LT4 75 mcg daily. Feeling good. Reports weight is stable. Does report stress and anxiety.  [FreeTextEntry2] : 38 [FreeTextEntry3] : 60 [FreeTextEntry4] : 2 [de-identified] : 8.3%

## 2024-02-07 NOTE — PHYSICAL EXAM
[Alert] : alert [No Acute Distress] : no acute distress [Normal Sclera/Conjunctiva] : normal sclera/conjunctiva [EOMI] : extra ocular movement intact [No LAD] : no lymphadenopathy [Thyroid Not Enlarged] : the thyroid was not enlarged [No Thyroid Nodules] : no palpable thyroid nodules [No Respiratory Distress] : no respiratory distress [Clear to Auscultation] : lungs were clear to auscultation bilaterally [Normal S1, S2] : normal S1 and S2 [Regular Rhythm] : with a regular rhythm [No Edema] : no peripheral edema [Pedal Pulses Normal] : the pedal pulses are present [Normal Bowel Sounds] : normal bowel sounds [Not Tender] : non-tender [Not Distended] : not distended [Soft] : abdomen soft [Normal Anterior Cervical Nodes] : no anterior cervical lymphadenopathy [No Spinal Tenderness] : no spinal tenderness [Kyphosis] : kyphosis present [No Clubbing, Cyanosis] : no clubbing  or cyanosis of the fingernails [No Rash] : no rash [Right foot was examined, including] : right foot ~C was examined, including visual inspection with sensory and pulse exams [Left foot was examined, including] : left foot ~C was examined, including visual inspection with sensory and pulse exams [Swelling] : swollen [2+] : 2+ in the dorsalis pedis [Normal Reflexes] : deep tendon reflexes were 2+ and symmetric [Normal Affect] : the affect was normal [Normal Mood] : the mood was normal [Diminished Throughout Both Feet] : normal tactile sensation with monofilament testing throughout both feet [de-identified] : 2/6 DANIEL [FreeTextEntry1] : bunion [FreeTextEntry2] : hammer toes [FreeTextEntry5] : bunion [FreeTextEntry6] : hammer toes

## 2024-02-07 NOTE — ASSESSMENT
[Carbohydrate Consistent Diet] : carbohydrate consistent diet [Hypoglycemia Management] : hypoglycemia management [Diabetes Foot Care] : diabetes foot care [Denosumab Therapy] : Risks  and benefits of denosumab therapy were discussed with the patient including eczema, cellulitis, osteonecrosis of the jaw and atypical femur fractures [FreeTextEntry1] : 74 y.o. 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  1. Type 1 1/2 DM- Suboptimal control with Hba1c of 9.0% today. Encouraged a carbohydrate consistent diet. Dexcom CGMS download reviewed with 38% in range and 60% high. We discussed that she is most likely out of honeymoon phase. Will increase Toujeo to 28 units daily and will continue Humalog 10 units before breakfast, 10 units before lunch and 14 units before dinner. Will add 1/50 correction scale if blood glucose is> 200. Recommend taking Humalog 10 to 15 minutes before eating. Also encouraged treating bedtime snack with Humalog 2 units if carb amount is greater than 15 grams. Avoid late night over correction and recommend Humalog 2 units if blood glucose is > 300. Encouraged patient to keep in touch with blood glucose readings. We discussed option of transitioning to insulin pump with Dexcom communication. We reviewed T slim control IQ, Omnipod 5 and iLet Bionic Pancreas. Patient would like to proceed with Omnipod 5. Stable CMP and urine alb/cr ratio in November 2023.  2. HTN- BP is at goal and will continue medications including ARB.  3. Hyperlipidemia- Lipids are at goal and will continue Rosuvastatin 5 mg daily.  4. Hypothyroidism/Hashimoto's disease- Patient is euthyroid. Will continue LT4 75 mcg daily for now.   5. Osteoporosis- DEXA scan performed in November 2023 shows left femoral neck -2.3 and total hip -2.3 which are stable and 1/3 radius -3.5 which is stable. Given increased risk of fracture, we discontinued Alendronate 70 mg po weekly and started Denosumab in October 2022. Reviewed risks and benefits of Denosumab including avoiding abrupt discontinuation causing bone loss and vertebral fractures. Encouraged weight bearing activity. Discussed appropriate calcium and vitamin D intake. Will continue vitamin D3 2,000 IU daily. Denosumab was given on11/8/23 from our office supply. Will repeat DEXA scan in the fall of 2025. Normal CMP/serum calcium and 25 vitamin D in November 2023.  Follow up in 3 months for DM and Denosumab.

## 2024-02-28 ENCOUNTER — APPOINTMENT (OUTPATIENT)
Dept: GASTROENTEROLOGY | Facility: CLINIC | Age: 74
End: 2024-02-28

## 2024-04-08 ENCOUNTER — APPOINTMENT (OUTPATIENT)
Dept: ENDOCRINOLOGY | Facility: CLINIC | Age: 74
End: 2024-04-08

## 2024-05-15 ENCOUNTER — APPOINTMENT (OUTPATIENT)
Dept: ENDOCRINOLOGY | Facility: CLINIC | Age: 74
End: 2024-05-15
Payer: MEDICARE

## 2024-05-15 VITALS
DIASTOLIC BLOOD PRESSURE: 62 MMHG | HEIGHT: 60.6 IN | BODY MASS INDEX: 23.33 KG/M2 | WEIGHT: 122 LBS | SYSTOLIC BLOOD PRESSURE: 110 MMHG | OXYGEN SATURATION: 98 % | HEART RATE: 64 BPM

## 2024-05-15 DIAGNOSIS — M81.0 AGE-RELATED OSTEOPOROSIS W/OUT CURRENT PATHOLOGICAL FRACTURE: ICD-10-CM

## 2024-05-15 DIAGNOSIS — E78.5 HYPERLIPIDEMIA, UNSPECIFIED: ICD-10-CM

## 2024-05-15 DIAGNOSIS — I10 ESSENTIAL (PRIMARY) HYPERTENSION: ICD-10-CM

## 2024-05-15 DIAGNOSIS — E10.9 TYPE 1 DIABETES MELLITUS W/OUT COMPLICATIONS: ICD-10-CM

## 2024-05-15 DIAGNOSIS — E03.9 HYPOTHYROIDISM, UNSPECIFIED: ICD-10-CM

## 2024-05-15 LAB
GLUCOSE BLDC GLUCOMTR-MCNC: 273
HBA1C MFR BLD HPLC: 8.4

## 2024-05-15 PROCEDURE — 95251 CONT GLUC MNTR ANALYSIS I&R: CPT

## 2024-05-15 PROCEDURE — 83036 HEMOGLOBIN GLYCOSYLATED A1C: CPT | Mod: QW

## 2024-05-15 PROCEDURE — 99215 OFFICE O/P EST HI 40 MIN: CPT | Mod: 25

## 2024-05-15 PROCEDURE — 96372 THER/PROPH/DIAG INJ SC/IM: CPT

## 2024-05-15 RX ORDER — INSULIN GLARGINE 300 U/ML
300 INJECTION, SOLUTION SUBCUTANEOUS
Qty: 3 | Refills: 3 | Status: ACTIVE | COMMUNITY
Start: 2023-08-30 | End: 1900-01-01

## 2024-05-15 RX ORDER — DENOSUMAB 60 MG/ML
60 INJECTION SUBCUTANEOUS
Qty: 1 | Refills: 0 | Status: COMPLETED | OUTPATIENT
Start: 2024-05-15

## 2024-05-15 RX ADMIN — DENOSUMAB 0 MG/ML: 60 INJECTION SUBCUTANEOUS at 00:00

## 2024-05-15 NOTE — REASON FOR VISIT
[Follow - Up] : a follow-up visit [Hypothyroidism] : hypothyroidism [Osteoporosis] : osteoporosis [Other___] : [unfilled] [Spouse] : spouse

## 2024-05-15 NOTE — ASSESSMENT
[Carbohydrate Consistent Diet] : carbohydrate consistent diet [Hypoglycemia Management] : hypoglycemia management [Diabetes Foot Care] : diabetes foot care [Denosumab Therapy] : Risks  and benefits of denosumab therapy were discussed with the patient including eczema, cellulitis, osteonecrosis of the jaw and atypical femur fractures [FreeTextEntry1] : 74 y.o. 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  1. Type 1 1/2 DM- Suboptimal control with Hba1c of 8.4% today. Encouraged a carbohydrate consistent diet. Dexcom CGMS download reviewed with 32% in range and 68% high. We discussed that she is most likely out of honeymoon phase. Will increase Toujeo to 30 units daily and will increase Humalog to 12 units before breakfast, to 12 units before lunch and to 16 units before dinner. Will add 1/50 correction scale if blood glucose is> 200. Recommend taking Humalog 10 to 15 minutes before eating. Also encouraged treating bedtime snack with Humalog 2 units if carb amount is greater than 15 grams. Avoid late night over correction and recommend Humalog 2 units if blood glucose is > 300. Encouraged patient to keep in touch with blood glucose readings. Recommend rotating insulin injection sites. We discussed option of transitioning to insulin pump with Dexcom communication. We reviewed T slim control IQ, Omnipod 5 and iLet Bionic Pancreas. Patient is no longer interested in an insulin pump. Stable CMP and urine alb/cr ratio in November 2023 and will recheck today.  2. HTN- BP is at goal and will continue medications including ARB.  3. Hyperlipidemia- Will check lipids and will continue Rosuvastatin 5 mg daily.  4. Hypothyroidism/Hashimoto's disease- Patient appears euthyroid. Will continue LT4 75 mcg daily for now. Will check TFTs today.   5. Osteoporosis- DEXA scan performed in November 2023 shows left femoral neck -2.3 and total hip -2.3 which are stable and 1/3 radius -3.5 which is stable. Given increased risk of fracture, we discontinued Alendronate 70 mg po weekly and started Denosumab in October 2022. Reviewed risks and benefits of Denosumab including avoiding abrupt discontinuation causing bone loss and vertebral fractures. Encouraged weight bearing activity. Discussed appropriate calcium and vitamin D intake. Will continue vitamin D3 2,000 IU daily. Denosumab was given today from our office supply. Will repeat DEXA scan in the fall of 2025. Normal CMP/serum calcium and 25 vitamin D in November 2023 and will recheck today.  Follow up in 3 months for DM.

## 2024-05-15 NOTE — PHYSICAL EXAM
[Alert] : alert [No Acute Distress] : no acute distress [Normal Sclera/Conjunctiva] : normal sclera/conjunctiva [EOMI] : extra ocular movement intact [No LAD] : no lymphadenopathy [Thyroid Not Enlarged] : the thyroid was not enlarged [No Thyroid Nodules] : no palpable thyroid nodules [No Respiratory Distress] : no respiratory distress [Clear to Auscultation] : lungs were clear to auscultation bilaterally [Normal S1, S2] : normal S1 and S2 [Regular Rhythm] : with a regular rhythm [No Edema] : no peripheral edema [Pedal Pulses Normal] : the pedal pulses are present [Normal Bowel Sounds] : normal bowel sounds [Not Tender] : non-tender [Not Distended] : not distended [Soft] : abdomen soft [Normal Anterior Cervical Nodes] : no anterior cervical lymphadenopathy [No Spinal Tenderness] : no spinal tenderness [Kyphosis] : kyphosis present [No Clubbing, Cyanosis] : no clubbing  or cyanosis of the fingernails [No Rash] : no rash [Right foot was examined, including] : right foot ~C was examined, including visual inspection with sensory and pulse exams [Left foot was examined, including] : left foot ~C was examined, including visual inspection with sensory and pulse exams [Swelling] : swollen [2+] : 2+ in the dorsalis pedis [Normal Reflexes] : deep tendon reflexes were 2+ and symmetric [Normal Affect] : the affect was normal [Normal Mood] : the mood was normal [Diminished Throughout Both Feet] : normal tactile sensation with monofilament testing throughout both feet [de-identified] : 2/6 DANIEL [de-identified] : indurated area on right abdomen [FreeTextEntry1] : bunion [FreeTextEntry2] : hammer toes [FreeTextEntry5] : bunion [FreeTextEntry6] : hammer toes

## 2024-05-15 NOTE — HISTORY OF PRESENT ILLNESS
[Continuous Glucose Monitoring] : Continuous Glucose Monitoring: Yes [Dexcom] : Dexcom [FreeTextEntry4] : 2 [de-identified] : 8.3% [FreeTextEntry1] : 74 y.o. female with h/o Type 1.5 DM diagnosed in 2009, osteoporosis, and hypothyroidism here for follow up visit.   Had Moderna COVID-19 vaccine X  2 plus booster X 3. 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 She had the flu in June 2023.   She was using Dexcom G7 now but not as happy with it given small  and loss of blue tooth connection. Notes significant hyperglycemia. She is now using Dexcom G6 because was planning to start Omnipod 5 but never started using the pump because felt it was too complicated.   Taking Toujeo 28 units daily. Takes Humalog 10 units before breakfast, 10 units before lunch and 14 units before dinner plus 1/50 correction if above 200. Does have evening snack and typically does not cover with insulin. If FS is above 300 at bedtime, takes Humalog 2 units.   No complications. UTD with ophthalmology and no retinopathy. No foot complaints. Did see podiatry.  No proteinuria. UTD with cardiology (Dr. Loaiza at [place]) and had normal stress test. Reports weight is stable now. Had full GI work up for weight loss which was normal. No abdominal pain and no change in bowels. Had colonoscopy on 3/9/22 which showed internal hemorrhoids only with normal colon.   In regard to osteoporosis, no falls or fractures.   DEXA scan in July 2017 left femoral neck -2.2, total hip -2.0 and 1/3 radius -3.6 and spine -1.3 is falsely elevated.  DEXA scan in July 2018 shows spine -1.1 which is stable with arthritis, left femoral neck -2.1 which is stable and total hip -2.0 which is stable and 1/3 radius -3.6 which is stable.   DEXA scan in July 2020 shows spine -0.9 which is stable with arthritis, left femoral neck -1.9 which is stable and total hip -2.0 which is stable and 1/3 radius -3.5 which is stable.  DEXA scan in May 2022 shows left femoral neck -2.3 which is a 7.4% decrease and total hip -2.3 which is 4.2% decrease and 1/3 radius -3.5 which is stable.  DEXA scan in November 2023 shows left femoral neck -2.3 and total hip -2.3 which are stable and 1/3 radius -3.5 which is stable.    She was taking Alendronate 70 mg weekly from July 2017 until July 2022.  She started Denosumab in October 2022.  Follows with dentist and had implants in October 2019. Takes calcium 600 mg daily and vitamin D 2,000 Iu daily.   In regard to hypothyroidism, takes LT4 75 mcg daily. Feeling good. Reports weight is stable. Does report stress and anxiety.  [FreeTextEntry2] : 32 [FreeTextEntry3] : 68

## 2024-05-16 LAB
25(OH)D3 SERPL-MCNC: 40 NG/ML
ALBUMIN SERPL ELPH-MCNC: 4.3 G/DL
ALP BLD-CCNC: 61 U/L
ALT SERPL-CCNC: 16 U/L
ANION GAP SERPL CALC-SCNC: 11 MMOL/L
AST SERPL-CCNC: 28 U/L
BILIRUB SERPL-MCNC: 0.8 MG/DL
BUN SERPL-MCNC: 15 MG/DL
CALCIUM SERPL-MCNC: 9 MG/DL
CHLORIDE SERPL-SCNC: 100 MMOL/L
CHOLEST SERPL-MCNC: 151 MG/DL
CO2 SERPL-SCNC: 26 MMOL/L
CREAT SERPL-MCNC: 0.54 MG/DL
CREAT SPEC-SCNC: 115 MG/DL
EGFR: 95 ML/MIN/1.73M2
GLUCOSE SERPL-MCNC: 291 MG/DL
HCT VFR BLD CALC: 41.9 %
HDLC SERPL-MCNC: 78 MG/DL
HGB BLD-MCNC: 14 G/DL
LDLC SERPL CALC-MCNC: 62 MG/DL
MCHC RBC-ENTMCNC: 32.9 PG
MCHC RBC-ENTMCNC: 33.4 GM/DL
MCV RBC AUTO: 98.4 FL
MICROALBUMIN 24H UR DL<=1MG/L-MCNC: <1.2 MG/DL
MICROALBUMIN/CREAT 24H UR-RTO: NORMAL MG/G
NONHDLC SERPL-MCNC: 73 MG/DL
PLATELET # BLD AUTO: 179 K/UL
POTASSIUM SERPL-SCNC: 4.2 MMOL/L
PROT SERPL-MCNC: 7.1 G/DL
RBC # BLD: 4.26 M/UL
RBC # FLD: 13.3 %
SODIUM SERPL-SCNC: 137 MMOL/L
T4 FREE SERPL-MCNC: 1.5 NG/DL
TRIGL SERPL-MCNC: 52 MG/DL
TSH SERPL-ACNC: 1.5 UIU/ML
WBC # FLD AUTO: 6.2 K/UL

## 2024-05-30 ENCOUNTER — RX RENEWAL (OUTPATIENT)
Age: 74
End: 2024-05-30

## 2024-05-30 RX ORDER — BLOOD SUGAR DIAGNOSTIC
STRIP MISCELLANEOUS
Qty: 300 | Refills: 3 | Status: ACTIVE | COMMUNITY
Start: 2017-09-25 | End: 1900-01-01

## 2024-08-28 ENCOUNTER — APPOINTMENT (OUTPATIENT)
Dept: ENDOCRINOLOGY | Facility: CLINIC | Age: 74
End: 2024-08-28
Payer: MEDICARE

## 2024-08-28 VITALS
HEIGHT: 60 IN | HEART RATE: 61 BPM | RESPIRATION RATE: 16 BRPM | DIASTOLIC BLOOD PRESSURE: 48 MMHG | TEMPERATURE: 97.9 F | OXYGEN SATURATION: 100 % | WEIGHT: 119.38 LBS | BODY MASS INDEX: 23.44 KG/M2 | SYSTOLIC BLOOD PRESSURE: 135 MMHG

## 2024-08-28 VITALS — SYSTOLIC BLOOD PRESSURE: 120 MMHG | DIASTOLIC BLOOD PRESSURE: 60 MMHG

## 2024-08-28 DIAGNOSIS — I10 ESSENTIAL (PRIMARY) HYPERTENSION: ICD-10-CM

## 2024-08-28 DIAGNOSIS — E10.9 TYPE 1 DIABETES MELLITUS W/OUT COMPLICATIONS: ICD-10-CM

## 2024-08-28 DIAGNOSIS — E78.5 HYPERLIPIDEMIA, UNSPECIFIED: ICD-10-CM

## 2024-08-28 DIAGNOSIS — E03.9 HYPOTHYROIDISM, UNSPECIFIED: ICD-10-CM

## 2024-08-28 DIAGNOSIS — M81.0 AGE-RELATED OSTEOPOROSIS W/OUT CURRENT PATHOLOGICAL FRACTURE: ICD-10-CM

## 2024-08-28 LAB — HBA1C MFR BLD HPLC: 8.1

## 2024-08-28 PROCEDURE — G2211 COMPLEX E/M VISIT ADD ON: CPT

## 2024-08-28 PROCEDURE — 83036 HEMOGLOBIN GLYCOSYLATED A1C: CPT | Mod: QW

## 2024-08-28 PROCEDURE — 99215 OFFICE O/P EST HI 40 MIN: CPT

## 2024-08-28 PROCEDURE — 95251 CONT GLUC MNTR ANALYSIS I&R: CPT

## 2024-08-28 NOTE — REASON FOR VISIT
[Follow - Up] : a follow-up visit [Hypothyroidism] : hypothyroidism [Osteoporosis] : osteoporosis [Other___] : [unfilled] [Family Member] : family member

## 2024-08-29 NOTE — HISTORY OF PRESENT ILLNESS
[Continuous Glucose Monitoring] : Continuous Glucose Monitoring: Yes [Dexcom] : Dexcom [FreeTextEntry1] : 74 y.o. female with h/o Type 1.5 DM diagnosed in 2009, osteoporosis, and hypothyroidism here for follow up visit.   Had Moderna COVID-19 vaccine X  2 plus booster X 3. 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 She had the flu in June 2023.   She was using Dexcom G7 now but not as happy with it given small  and loss of blue tooth connection. Notes significant hyperglycemia. She is now using Dexcom G6 because was planning to start Omnipod 5 but never started using the pump because felt it was too complicated.   Taking Toujeo 26 units daily. Takes Humalog 6 to 7 units before breakfast, 7 to 8 units before lunch and 9 to 10 units before dinner plus 1/50 correction if above 200. Does have evening snack and typically does not cover with insulin. If FS is above 300 at bedtime, takes Humalog 2 units.   No complications. UTD with ophthalmology and no retinopathy. No foot complaints except for bunions. Did see podiatry.  No proteinuria. UTD with cardiology (Dr. Loaiza at [place]) and had normal stress test. Reports weight is down now. Had full GI work up for weight loss which was normal. No abdominal pain and no change in bowels. Had colonoscopy on 3/9/22 which showed internal hemorrhoids only with normal colon.   In regard to osteoporosis, no falls or fractures.   DEXA scan in July 2017 left femoral neck -2.2, total hip -2.0 and 1/3 radius -3.6 and spine -1.3 is falsely elevated.  DEXA scan in July 2018 shows spine -1.1 which is stable with arthritis, left femoral neck -2.1 which is stable and total hip -2.0 which is stable and 1/3 radius -3.6 which is stable.   DEXA scan in July 2020 shows spine -0.9 which is stable with arthritis, left femoral neck -1.9 which is stable and total hip -2.0 which is stable and 1/3 radius -3.5 which is stable.  DEXA scan in May 2022 shows left femoral neck -2.3 which is a 7.4% decrease and total hip -2.3 which is 4.2% decrease and 1/3 radius -3.5 which is stable.  DEXA scan in November 2023 shows left femoral neck -2.3 and total hip -2.3 which are stable and 1/3 radius -3.5 which is stable.    She was taking Alendronate 70 mg weekly from July 2017 until July 2022.  She started Denosumab in October 2022.  Follows with dentist and had implants in October 2019. Takes calcium 600 mg daily and vitamin D 2,000 Iu daily.   In regard to hypothyroidism, takes LT4 75 mcg po daily. Feeling good. Reports weight is down. Does report stress and anxiety.  [FreeTextEntry2] : 29 [FreeTextEntry3] : 71 [FreeTextEntry4] : 0

## 2024-08-29 NOTE — REVIEW OF SYSTEMS
[Hair Loss] : hair loss [Pain/Numbness of Digits] : pain/numbness of digits [Anxiety] : anxiety [Stress] : stress [Negative] : Endocrine [Fatigue] : no fatigue [Recent Weight Gain (___ Lbs)] : no recent weight gain [Recent Weight Loss (___ Lbs)] : recent weight loss: [unfilled] lbs [Polyuria] : no polyuria [Swelling] : no swelling

## 2024-08-29 NOTE — PHYSICAL EXAM
[Alert] : alert [No Acute Distress] : no acute distress [Normal Sclera/Conjunctiva] : normal sclera/conjunctiva [EOMI] : extra ocular movement intact [No LAD] : no lymphadenopathy [Thyroid Not Enlarged] : the thyroid was not enlarged [No Thyroid Nodules] : no palpable thyroid nodules [No Respiratory Distress] : no respiratory distress [Clear to Auscultation] : lungs were clear to auscultation bilaterally [Normal S1, S2] : normal S1 and S2 [Regular Rhythm] : with a regular rhythm [No Edema] : no peripheral edema [Pedal Pulses Normal] : the pedal pulses are present [Normal Bowel Sounds] : normal bowel sounds [Not Tender] : non-tender [Not Distended] : not distended [Soft] : abdomen soft [Normal Anterior Cervical Nodes] : no anterior cervical lymphadenopathy [No Spinal Tenderness] : no spinal tenderness [Kyphosis] : kyphosis present [No Clubbing, Cyanosis] : no clubbing  or cyanosis of the fingernails [No Rash] : no rash [Right foot was examined, including] : right foot ~C was examined, including visual inspection with sensory and pulse exams [Left foot was examined, including] : left foot ~C was examined, including visual inspection with sensory and pulse exams [Swelling] : swollen [2+] : 2+ in the dorsalis pedis [Normal Reflexes] : deep tendon reflexes were 2+ and symmetric [Normal Affect] : the affect was normal [Normal Mood] : the mood was normal [Diminished Throughout Both Feet] : diminished tactile sensation with monofilament testing throughout both feet [de-identified] : indurated area on right abdomen [de-identified] : 2/6 DANIEL [FreeTextEntry1] : bunion [FreeTextEntry2] : hammer toes [FreeTextEntry5] : bunion [FreeTextEntry6] : hammer toes

## 2024-08-29 NOTE — HISTORY OF PRESENT ILLNESS
[Continuous Glucose Monitoring] : Continuous Glucose Monitoring: Yes [Dexcom] : Dexcom [FreeTextEntry1] : 74 y.o. female with h/o Type 1.5 DM diagnosed in 2009, osteoporosis, and hypothyroidism here for follow up visit.   Had Moderna COVID-19 vaccine X  2 plus booster X 3. 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 She had the flu in June 2023.   She was using Dexcom G7 now but not as happy with it given small  and loss of blue tooth connection. Notes significant hyperglycemia. She is now using Dexcom G6 because was planning to start Omnipod 5 but never started using the pump because felt it was too complicated.   Taking Toujeo 26 units daily. Takes Humalog 6 to 7 units before breakfast, 7 to 8 units before lunch and 9 to 10 units before dinner plus 1/50 correction if above 200. Does have evening snack and typically does not cover with insulin. If FS is above 300 at bedtime, takes Humalog 2 units.   No complications. UTD with ophthalmology and no retinopathy. No foot complaints except for bunions. Did see podiatry.  No proteinuria. UTD with cardiology (Dr. Loaiza at [place]) and had normal stress test. Reports weight is down now. Had full GI work up for weight loss which was normal. No abdominal pain and no change in bowels. Had colonoscopy on 3/9/22 which showed internal hemorrhoids only with normal colon.   In regard to osteoporosis, no falls or fractures.   DEXA scan in July 2017 left femoral neck -2.2, total hip -2.0 and 1/3 radius -3.6 and spine -1.3 is falsely elevated.  DEXA scan in July 2018 shows spine -1.1 which is stable with arthritis, left femoral neck -2.1 which is stable and total hip -2.0 which is stable and 1/3 radius -3.6 which is stable.   DEXA scan in July 2020 shows spine -0.9 which is stable with arthritis, left femoral neck -1.9 which is stable and total hip -2.0 which is stable and 1/3 radius -3.5 which is stable.  DEXA scan in May 2022 shows left femoral neck -2.3 which is a 7.4% decrease and total hip -2.3 which is 4.2% decrease and 1/3 radius -3.5 which is stable.  DEXA scan in November 2023 shows left femoral neck -2.3 and total hip -2.3 which are stable and 1/3 radius -3.5 which is stable.    She was taking Alendronate 70 mg weekly from July 2017 until July 2022.  She started Denosumab in October 2022.  Follows with dentist and had implants in October 2019. Takes calcium 600 mg daily and vitamin D 2,000 Iu daily.   In regard to hypothyroidism, takes LT4 75 mcg po daily. Feeling good. Reports weight is down. Does report stress and anxiety.  [FreeTextEntry2] : 29 [FreeTextEntry3] : 71 [FreeTextEntry4] : 0

## 2024-08-29 NOTE — PHYSICAL EXAM
[Alert] : alert [No Acute Distress] : no acute distress [Normal Sclera/Conjunctiva] : normal sclera/conjunctiva [EOMI] : extra ocular movement intact [No LAD] : no lymphadenopathy [Thyroid Not Enlarged] : the thyroid was not enlarged [No Thyroid Nodules] : no palpable thyroid nodules [No Respiratory Distress] : no respiratory distress [Clear to Auscultation] : lungs were clear to auscultation bilaterally [Normal S1, S2] : normal S1 and S2 [Regular Rhythm] : with a regular rhythm [No Edema] : no peripheral edema [Pedal Pulses Normal] : the pedal pulses are present [Normal Bowel Sounds] : normal bowel sounds [Not Tender] : non-tender [Not Distended] : not distended [Soft] : abdomen soft [Normal Anterior Cervical Nodes] : no anterior cervical lymphadenopathy [No Spinal Tenderness] : no spinal tenderness [Kyphosis] : kyphosis present [No Clubbing, Cyanosis] : no clubbing  or cyanosis of the fingernails [No Rash] : no rash [Right foot was examined, including] : right foot ~C was examined, including visual inspection with sensory and pulse exams [Left foot was examined, including] : left foot ~C was examined, including visual inspection with sensory and pulse exams [Swelling] : swollen [2+] : 2+ in the dorsalis pedis [Normal Reflexes] : deep tendon reflexes were 2+ and symmetric [Normal Affect] : the affect was normal [Normal Mood] : the mood was normal [Diminished Throughout Both Feet] : diminished tactile sensation with monofilament testing throughout both feet [de-identified] : indurated area on right abdomen [de-identified] : 2/6 DANIEL [FreeTextEntry1] : bunion [FreeTextEntry2] : hammer toes [FreeTextEntry5] : bunion [FreeTextEntry6] : hammer toes

## 2024-08-29 NOTE — ASSESSMENT
[Carbohydrate Consistent Diet] : carbohydrate consistent diet [Hypoglycemia Management] : hypoglycemia management [Diabetes Foot Care] : diabetes foot care [Denosumab Therapy] : Risks  and benefits of denosumab therapy were discussed with the patient including eczema, cellulitis, osteonecrosis of the jaw and atypical femur fractures [FreeTextEntry1] : 74 y.o. 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  1. Type 1 1/2 DM- Suboptimal control with Hba1c of 8.1% today. Encouraged a carbohydrate consistent diet. Dexcom CGMS download reviewed with 29% in range and 71% high. We discussed that she is most likely out of honeymoon phase. Will continue Toujeo 26 units daily and will increase Humalog by 1 unit before meals. Will add 1/50 correction scale if blood glucose is> 200. Recommend taking Humalog 10 to 15 minutes before eating. Also encouraged treating bedtime snack with Humalog 2 units if carb amount is greater than 15 grams. Avoid late night over correction and recommend Humalog 2 units if blood glucose is > 300. Encouraged patient to keep in touch with blood glucose readings. Recommend rotating insulin injection sites. We discussed option of transitioning to insulin pump with Dexcom communication. We reviewed T slim control IQ, Omnipod 5 and iLet Bionic Pancreas. Patient is no longer interested in an insulin pump. Stable CMP and urine alb/cr ratio in May 2024.  2. HTN- BP is at goal and will continue medications including ARB.  3. Hyperlipidemia- Lipids are at goal and will continue Rosuvastatin 5 mg po daily.  4. Hypothyroidism/Hashimoto's disease- Patient is euthyroid. Will continue LT4 75 mcg daily for now.   5. Osteoporosis- DEXA scan performed in November 2023 shows left femoral neck -2.3 and total hip -2.3 which are stable and 1/3 radius -3.5 which is stable. Given increased risk of fracture, we discontinued Alendronate 70 mg po weekly and started Denosumab in October 2022. Reviewed risks and benefits of Denosumab including avoiding abrupt discontinuation causing bone loss and vertebral fractures. Encouraged weight bearing activity. Discussed appropriate calcium and vitamin D intake. Will continue vitamin D3 2,000 IU daily. Denosumab was given in May 2024 from our office supply. Will repeat DEXA scan in the fall of 2025. Normal CMP/serum calcium and 25 vitamin D in May 2024.  Follow up in 3 months for DM and Prolia.

## 2024-10-17 ENCOUNTER — RESULT REVIEW (OUTPATIENT)
Age: 74
End: 2024-10-17

## 2024-10-17 ENCOUNTER — OUTPATIENT (OUTPATIENT)
Dept: OUTPATIENT SERVICES | Facility: HOSPITAL | Age: 74
LOS: 1 days | End: 2024-10-17
Payer: MEDICARE

## 2024-10-17 ENCOUNTER — APPOINTMENT (OUTPATIENT)
Dept: ULTRASOUND IMAGING | Facility: IMAGING CENTER | Age: 74
End: 2024-10-17
Payer: MEDICARE

## 2024-10-17 ENCOUNTER — APPOINTMENT (OUTPATIENT)
Dept: MAMMOGRAPHY | Facility: IMAGING CENTER | Age: 74
End: 2024-10-17
Payer: MEDICARE

## 2024-10-17 DIAGNOSIS — N60.19 DIFFUSE CYSTIC MASTOPATHY OF UNSPECIFIED BREAST: ICD-10-CM

## 2024-10-17 PROCEDURE — 76641 ULTRASOUND BREAST COMPLETE: CPT

## 2024-10-17 PROCEDURE — 77067 SCR MAMMO BI INCL CAD: CPT

## 2024-10-17 PROCEDURE — 77067 SCR MAMMO BI INCL CAD: CPT | Mod: 26

## 2024-10-17 PROCEDURE — 77063 BREAST TOMOSYNTHESIS BI: CPT | Mod: 26

## 2024-10-17 PROCEDURE — 76641 ULTRASOUND BREAST COMPLETE: CPT | Mod: 26,50

## 2024-10-17 PROCEDURE — 77063 BREAST TOMOSYNTHESIS BI: CPT

## 2024-11-12 ENCOUNTER — RX RENEWAL (OUTPATIENT)
Age: 74
End: 2024-11-12

## 2024-11-19 ENCOUNTER — APPOINTMENT (OUTPATIENT)
Dept: ENDOCRINOLOGY | Facility: CLINIC | Age: 74
End: 2024-11-19
Payer: MEDICARE

## 2024-11-19 VITALS
SYSTOLIC BLOOD PRESSURE: 135 MMHG | TEMPERATURE: 98 F | RESPIRATION RATE: 16 BRPM | BODY MASS INDEX: 23.36 KG/M2 | HEIGHT: 60 IN | WEIGHT: 119 LBS | HEART RATE: 65 BPM | DIASTOLIC BLOOD PRESSURE: 70 MMHG | OXYGEN SATURATION: 95 %

## 2024-11-19 DIAGNOSIS — I10 ESSENTIAL (PRIMARY) HYPERTENSION: ICD-10-CM

## 2024-11-19 DIAGNOSIS — M81.0 AGE-RELATED OSTEOPOROSIS W/OUT CURRENT PATHOLOGICAL FRACTURE: ICD-10-CM

## 2024-11-19 DIAGNOSIS — E78.5 HYPERLIPIDEMIA, UNSPECIFIED: ICD-10-CM

## 2024-11-19 DIAGNOSIS — E03.9 HYPOTHYROIDISM, UNSPECIFIED: ICD-10-CM

## 2024-11-19 DIAGNOSIS — E10.9 TYPE 1 DIABETES MELLITUS W/OUT COMPLICATIONS: ICD-10-CM

## 2024-11-19 LAB — HBA1C MFR BLD HPLC: 9.5

## 2024-11-19 PROCEDURE — 96372 THER/PROPH/DIAG INJ SC/IM: CPT

## 2024-11-19 PROCEDURE — 99215 OFFICE O/P EST HI 40 MIN: CPT | Mod: 25

## 2024-11-19 PROCEDURE — 95251 CONT GLUC MNTR ANALYSIS I&R: CPT

## 2024-11-19 PROCEDURE — 83036 HEMOGLOBIN GLYCOSYLATED A1C: CPT | Mod: QW

## 2024-11-19 RX ORDER — DENOSUMAB 60 MG/ML
60 INJECTION SUBCUTANEOUS
Qty: 1 | Refills: 0 | Status: COMPLETED | OUTPATIENT
Start: 2024-11-19

## 2024-11-19 RX ADMIN — DENOSUMAB 0 MG/ML: 60 INJECTION SUBCUTANEOUS at 00:00

## 2024-11-20 LAB
ALBUMIN SERPL ELPH-MCNC: 4 G/DL
ALP BLD-CCNC: 76 U/L
ALT SERPL-CCNC: 11 U/L
ANION GAP SERPL CALC-SCNC: 10 MMOL/L
AST SERPL-CCNC: 21 U/L
BILIRUB SERPL-MCNC: 0.4 MG/DL
BUN SERPL-MCNC: 16 MG/DL
CALCIUM SERPL-MCNC: 9.5 MG/DL
CHLORIDE SERPL-SCNC: 104 MMOL/L
CHOLEST SERPL-MCNC: 150 MG/DL
CO2 SERPL-SCNC: 25 MMOL/L
CREAT SERPL-MCNC: 0.57 MG/DL
CREAT SPEC-SCNC: 82 MG/DL
EGFR: 94 ML/MIN/1.73M2
GLUCOSE SERPL-MCNC: 173 MG/DL
HCT VFR BLD CALC: 41.5 %
HDLC SERPL-MCNC: 71 MG/DL
HGB BLD-MCNC: 13.7 G/DL
LDLC SERPL CALC-MCNC: 62 MG/DL
MCHC RBC-ENTMCNC: 32 PG
MCHC RBC-ENTMCNC: 33 G/DL
MCV RBC AUTO: 97 FL
MICROALBUMIN 24H UR DL<=1MG/L-MCNC: <1.2 MG/DL
MICROALBUMIN/CREAT 24H UR-RTO: NORMAL MG/G
NONHDLC SERPL-MCNC: 79 MG/DL
PLATELET # BLD AUTO: 201 K/UL
POTASSIUM SERPL-SCNC: 4.1 MMOL/L
PROT SERPL-MCNC: 6.9 G/DL
RBC # BLD: 4.28 M/UL
RBC # FLD: 13.7 %
SODIUM SERPL-SCNC: 140 MMOL/L
T4 FREE SERPL-MCNC: 1.3 NG/DL
TRIGL SERPL-MCNC: 90 MG/DL
TSH SERPL-ACNC: 3.29 UIU/ML
WBC # FLD AUTO: 5.64 K/UL

## 2025-02-05 ENCOUNTER — APPOINTMENT (OUTPATIENT)
Dept: ENDOCRINOLOGY | Facility: CLINIC | Age: 75
End: 2025-02-05
Payer: MEDICARE

## 2025-02-05 VITALS
OXYGEN SATURATION: 99 % | SYSTOLIC BLOOD PRESSURE: 120 MMHG | WEIGHT: 121 LBS | DIASTOLIC BLOOD PRESSURE: 70 MMHG | HEART RATE: 68 BPM | HEIGHT: 60 IN | BODY MASS INDEX: 23.75 KG/M2

## 2025-02-05 DIAGNOSIS — M81.0 AGE-RELATED OSTEOPOROSIS W/OUT CURRENT PATHOLOGICAL FRACTURE: ICD-10-CM

## 2025-02-05 DIAGNOSIS — E10.9 TYPE 1 DIABETES MELLITUS W/OUT COMPLICATIONS: ICD-10-CM

## 2025-02-05 DIAGNOSIS — E03.9 HYPOTHYROIDISM, UNSPECIFIED: ICD-10-CM

## 2025-02-05 DIAGNOSIS — E78.5 HYPERLIPIDEMIA, UNSPECIFIED: ICD-10-CM

## 2025-02-05 DIAGNOSIS — I10 ESSENTIAL (PRIMARY) HYPERTENSION: ICD-10-CM

## 2025-02-05 LAB — HBA1C MFR BLD HPLC: 8.2

## 2025-02-05 PROCEDURE — 99214 OFFICE O/P EST MOD 30 MIN: CPT

## 2025-02-05 PROCEDURE — 95251 CONT GLUC MNTR ANALYSIS I&R: CPT

## 2025-02-05 PROCEDURE — 83036 HEMOGLOBIN GLYCOSYLATED A1C: CPT | Mod: QW

## 2025-02-05 PROCEDURE — G2211 COMPLEX E/M VISIT ADD ON: CPT

## 2025-04-15 ENCOUNTER — APPOINTMENT (OUTPATIENT)
Age: 75
End: 2025-04-15
Payer: MEDICARE

## 2025-04-15 ENCOUNTER — NON-APPOINTMENT (OUTPATIENT)
Age: 75
End: 2025-04-15

## 2025-04-15 PROCEDURE — 92286 ANT SGM IMG I&R SPECLR MIC: CPT

## 2025-04-15 PROCEDURE — 92012 INTRM OPH EXAM EST PATIENT: CPT

## 2025-04-15 PROCEDURE — 92250 FUNDUS PHOTOGRAPHY W/I&R: CPT | Mod: 59

## 2025-04-28 ENCOUNTER — RX RENEWAL (OUTPATIENT)
Age: 75
End: 2025-04-28

## 2025-05-21 ENCOUNTER — APPOINTMENT (OUTPATIENT)
Dept: ENDOCRINOLOGY | Facility: CLINIC | Age: 75
End: 2025-05-21
Payer: MEDICARE

## 2025-05-21 VITALS
SYSTOLIC BLOOD PRESSURE: 140 MMHG | BODY MASS INDEX: 23.56 KG/M2 | HEART RATE: 70 BPM | HEIGHT: 60 IN | OXYGEN SATURATION: 99 % | WEIGHT: 120 LBS | DIASTOLIC BLOOD PRESSURE: 77 MMHG

## 2025-05-21 VITALS — SYSTOLIC BLOOD PRESSURE: 112 MMHG | DIASTOLIC BLOOD PRESSURE: 70 MMHG

## 2025-05-21 DIAGNOSIS — I10 ESSENTIAL (PRIMARY) HYPERTENSION: ICD-10-CM

## 2025-05-21 DIAGNOSIS — E03.9 HYPOTHYROIDISM, UNSPECIFIED: ICD-10-CM

## 2025-05-21 DIAGNOSIS — M81.0 AGE-RELATED OSTEOPOROSIS W/OUT CURRENT PATHOLOGICAL FRACTURE: ICD-10-CM

## 2025-05-21 DIAGNOSIS — E10.9 TYPE 1 DIABETES MELLITUS W/OUT COMPLICATIONS: ICD-10-CM

## 2025-05-21 DIAGNOSIS — E78.5 HYPERLIPIDEMIA, UNSPECIFIED: ICD-10-CM

## 2025-05-21 LAB — HBA1C MFR BLD HPLC: 8.7

## 2025-05-21 PROCEDURE — 96372 THER/PROPH/DIAG INJ SC/IM: CPT

## 2025-05-21 PROCEDURE — 99215 OFFICE O/P EST HI 40 MIN: CPT | Mod: 25

## 2025-05-21 PROCEDURE — 95251 CONT GLUC MNTR ANALYSIS I&R: CPT

## 2025-05-21 PROCEDURE — 83036 HEMOGLOBIN GLYCOSYLATED A1C: CPT | Mod: QW

## 2025-05-21 RX ORDER — DENOSUMAB 60 MG/ML
60 INJECTION SUBCUTANEOUS
Qty: 1 | Refills: 0 | Status: COMPLETED | OUTPATIENT
Start: 2025-05-21

## 2025-05-21 RX ADMIN — DENOSUMAB 0 MG/ML: 60 INJECTION SUBCUTANEOUS at 00:00

## 2025-05-22 LAB
ALBUMIN SERPL ELPH-MCNC: 4 G/DL
ALP BLD-CCNC: 63 U/L
ALT SERPL-CCNC: 16 U/L
ANION GAP SERPL CALC-SCNC: 12 MMOL/L
AST SERPL-CCNC: 24 U/L
BILIRUB SERPL-MCNC: 0.6 MG/DL
BUN SERPL-MCNC: 14 MG/DL
CALCIUM SERPL-MCNC: 9.5 MG/DL
CHLORIDE SERPL-SCNC: 102 MMOL/L
CHOLEST SERPL-MCNC: 137 MG/DL
CO2 SERPL-SCNC: 26 MMOL/L
CREAT SERPL-MCNC: 0.6 MG/DL
CREAT SPEC-SCNC: 62 MG/DL
EGFRCR SERPLBLD CKD-EPI 2021: 92 ML/MIN/1.73M2
GLUCOSE SERPL-MCNC: 159 MG/DL
HCT VFR BLD CALC: 40.5 %
HDLC SERPL-MCNC: 79 MG/DL
HGB BLD-MCNC: 13.4 G/DL
LDLC SERPL-MCNC: 48 MG/DL
MCHC RBC-ENTMCNC: 32.2 PG
MCHC RBC-ENTMCNC: 33.1 G/DL
MCV RBC AUTO: 97.4 FL
MICROALBUMIN 24H UR DL<=1MG/L-MCNC: <1.2 MG/DL
MICROALBUMIN/CREAT 24H UR-RTO: NORMAL MG/G
NONHDLC SERPL-MCNC: 59 MG/DL
PLATELET # BLD AUTO: 161 K/UL
POTASSIUM SERPL-SCNC: 4.1 MMOL/L
PROT SERPL-MCNC: 6.6 G/DL
RBC # BLD: 4.16 M/UL
RBC # FLD: 13.2 %
SODIUM SERPL-SCNC: 140 MMOL/L
T4 FREE SERPL-MCNC: 1.3 NG/DL
TRIGL SERPL-MCNC: 43 MG/DL
TSH SERPL-ACNC: 3.48 UIU/ML
WBC # FLD AUTO: 5.97 K/UL

## 2025-06-20 ENCOUNTER — RX RENEWAL (OUTPATIENT)
Age: 75
End: 2025-06-20

## 2025-09-03 ENCOUNTER — APPOINTMENT (OUTPATIENT)
Dept: ENDOCRINOLOGY | Facility: CLINIC | Age: 75
End: 2025-09-03